# Patient Record
Sex: FEMALE | Race: OTHER | NOT HISPANIC OR LATINO
[De-identification: names, ages, dates, MRNs, and addresses within clinical notes are randomized per-mention and may not be internally consistent; named-entity substitution may affect disease eponyms.]

---

## 2017-06-26 PROBLEM — Z00.00 ENCOUNTER FOR PREVENTIVE HEALTH EXAMINATION: Status: ACTIVE | Noted: 2017-06-26

## 2017-08-22 ENCOUNTER — APPOINTMENT (OUTPATIENT)
Dept: OBGYN | Facility: CLINIC | Age: 32
End: 2017-08-22
Payer: COMMERCIAL

## 2017-08-22 VITALS
DIASTOLIC BLOOD PRESSURE: 74 MMHG | BODY MASS INDEX: 19.7 KG/M2 | WEIGHT: 130 LBS | SYSTOLIC BLOOD PRESSURE: 110 MMHG | HEIGHT: 68 IN

## 2017-08-22 PROCEDURE — 81025 URINE PREGNANCY TEST: CPT

## 2017-08-22 PROCEDURE — 99395 PREV VISIT EST AGE 18-39: CPT

## 2017-08-25 LAB
CYTOLOGY CVX/VAG DOC THIN PREP: NORMAL
HPV HIGH+LOW RISK DNA PNL CVX: NEGATIVE

## 2017-09-07 ENCOUNTER — TRANSCRIPTION ENCOUNTER (OUTPATIENT)
Age: 32
End: 2017-09-07

## 2018-02-09 ENCOUNTER — LABORATORY RESULT (OUTPATIENT)
Age: 33
End: 2018-02-09

## 2018-02-10 ENCOUNTER — LABORATORY RESULT (OUTPATIENT)
Age: 33
End: 2018-02-10

## 2018-02-14 LAB — HCG-TM SERPL-MCNC: 12 MIU/ML

## 2018-12-04 ENCOUNTER — APPOINTMENT (OUTPATIENT)
Dept: ANTEPARTUM | Facility: CLINIC | Age: 33
End: 2018-12-04
Payer: COMMERCIAL

## 2018-12-04 ENCOUNTER — APPOINTMENT (OUTPATIENT)
Dept: OBGYN | Facility: CLINIC | Age: 33
End: 2018-12-04
Payer: COMMERCIAL

## 2018-12-04 ENCOUNTER — ASOB RESULT (OUTPATIENT)
Age: 33
End: 2018-12-04

## 2018-12-04 VITALS
SYSTOLIC BLOOD PRESSURE: 124 MMHG | HEART RATE: 86 BPM | DIASTOLIC BLOOD PRESSURE: 82 MMHG | HEIGHT: 68 IN | WEIGHT: 142 LBS | BODY MASS INDEX: 21.52 KG/M2

## 2018-12-04 LAB
HCG UR QL: POSITIVE
QUALITY CONTROL: YES

## 2018-12-04 PROCEDURE — 81025 URINE PREGNANCY TEST: CPT

## 2018-12-04 PROCEDURE — 76817 TRANSVAGINAL US OBSTETRIC: CPT

## 2018-12-04 PROCEDURE — 99213 OFFICE O/P EST LOW 20 MIN: CPT

## 2018-12-19 ENCOUNTER — APPOINTMENT (OUTPATIENT)
Dept: ANTEPARTUM | Facility: CLINIC | Age: 33
End: 2018-12-19
Payer: COMMERCIAL

## 2018-12-19 ENCOUNTER — ASOB RESULT (OUTPATIENT)
Age: 33
End: 2018-12-19

## 2018-12-19 ENCOUNTER — APPOINTMENT (OUTPATIENT)
Dept: OBGYN | Facility: CLINIC | Age: 33
End: 2018-12-19
Payer: COMMERCIAL

## 2018-12-19 VITALS
DIASTOLIC BLOOD PRESSURE: 82 MMHG | HEIGHT: 68 IN | BODY MASS INDEX: 21.82 KG/M2 | SYSTOLIC BLOOD PRESSURE: 118 MMHG | WEIGHT: 144 LBS

## 2018-12-19 PROCEDURE — 0501F PRENATAL FLOW SHEET: CPT

## 2018-12-19 PROCEDURE — 0502F SUBSEQUENT PRENATAL CARE: CPT

## 2018-12-19 PROCEDURE — 76801 OB US < 14 WKS SINGLE FETUS: CPT

## 2019-01-10 ENCOUNTER — ASOB RESULT (OUTPATIENT)
Age: 34
End: 2019-01-10

## 2019-01-10 ENCOUNTER — APPOINTMENT (OUTPATIENT)
Dept: ANTEPARTUM | Facility: CLINIC | Age: 34
End: 2019-01-10
Payer: COMMERCIAL

## 2019-01-10 PROCEDURE — 36416 COLLJ CAPILLARY BLOOD SPEC: CPT

## 2019-01-10 PROCEDURE — 76813 OB US NUCHAL MEAS 1 GEST: CPT

## 2019-01-12 ENCOUNTER — LABORATORY RESULT (OUTPATIENT)
Age: 34
End: 2019-01-12

## 2019-01-16 LAB
1ST TRIMESTER DATA: NORMAL
ADDENDUM DOC: NORMAL
AFP PNL SERPL: NORMAL
AFP SERPL-ACNC: NORMAL
CLINICAL BIOCHEMIST REVIEW: NORMAL
FREE BETA HCG 1ST TRIMESTER: NORMAL
Lab: NORMAL
NOTES NTD: NORMAL
NT: NORMAL
PAPP-A SERPL-ACNC: NORMAL
TRISOMY 18/3: NORMAL

## 2019-01-17 ENCOUNTER — APPOINTMENT (OUTPATIENT)
Dept: OBGYN | Facility: CLINIC | Age: 34
End: 2019-01-17
Payer: COMMERCIAL

## 2019-01-17 VITALS
SYSTOLIC BLOOD PRESSURE: 119 MMHG | BODY MASS INDEX: 22.58 KG/M2 | WEIGHT: 149 LBS | DIASTOLIC BLOOD PRESSURE: 81 MMHG | HEIGHT: 68 IN

## 2019-01-17 PROCEDURE — 0502F SUBSEQUENT PRENATAL CARE: CPT

## 2019-02-13 ENCOUNTER — APPOINTMENT (OUTPATIENT)
Dept: OBGYN | Facility: CLINIC | Age: 34
End: 2019-02-13
Payer: COMMERCIAL

## 2019-02-13 VITALS
DIASTOLIC BLOOD PRESSURE: 60 MMHG | SYSTOLIC BLOOD PRESSURE: 110 MMHG | HEIGHT: 68 IN | WEIGHT: 152 LBS | BODY MASS INDEX: 23.04 KG/M2

## 2019-02-13 PROCEDURE — 0502F SUBSEQUENT PRENATAL CARE: CPT

## 2019-03-04 ENCOUNTER — TRANSCRIPTION ENCOUNTER (OUTPATIENT)
Age: 34
End: 2019-03-04

## 2019-03-05 ENCOUNTER — APPOINTMENT (OUTPATIENT)
Dept: ANTEPARTUM | Facility: CLINIC | Age: 34
End: 2019-03-05
Payer: COMMERCIAL

## 2019-03-05 ENCOUNTER — ASOB RESULT (OUTPATIENT)
Age: 34
End: 2019-03-05

## 2019-03-05 PROCEDURE — 76817 TRANSVAGINAL US OBSTETRIC: CPT

## 2019-03-05 PROCEDURE — 76811 OB US DETAILED SNGL FETUS: CPT

## 2019-03-13 ENCOUNTER — APPOINTMENT (OUTPATIENT)
Dept: OBGYN | Facility: CLINIC | Age: 34
End: 2019-03-13
Payer: COMMERCIAL

## 2019-03-13 ENCOUNTER — NON-APPOINTMENT (OUTPATIENT)
Age: 34
End: 2019-03-13

## 2019-03-13 VITALS
WEIGHT: 160 LBS | DIASTOLIC BLOOD PRESSURE: 60 MMHG | SYSTOLIC BLOOD PRESSURE: 110 MMHG | BODY MASS INDEX: 24.25 KG/M2 | HEIGHT: 68 IN

## 2019-03-13 PROCEDURE — 0502F SUBSEQUENT PRENATAL CARE: CPT

## 2019-03-14 ENCOUNTER — LABORATORY RESULT (OUTPATIENT)
Age: 34
End: 2019-03-14

## 2019-04-10 ENCOUNTER — APPOINTMENT (OUTPATIENT)
Dept: OBGYN | Facility: CLINIC | Age: 34
End: 2019-04-10
Payer: COMMERCIAL

## 2019-04-10 VITALS
SYSTOLIC BLOOD PRESSURE: 112 MMHG | BODY MASS INDEX: 25.01 KG/M2 | HEIGHT: 68 IN | DIASTOLIC BLOOD PRESSURE: 65 MMHG | WEIGHT: 165 LBS

## 2019-04-10 PROCEDURE — 0502F SUBSEQUENT PRENATAL CARE: CPT

## 2019-04-13 ENCOUNTER — LABORATORY RESULT (OUTPATIENT)
Age: 34
End: 2019-04-13

## 2019-04-30 ENCOUNTER — APPOINTMENT (OUTPATIENT)
Dept: ANTEPARTUM | Facility: CLINIC | Age: 34
End: 2019-04-30
Payer: COMMERCIAL

## 2019-04-30 ENCOUNTER — ASOB RESULT (OUTPATIENT)
Age: 34
End: 2019-04-30

## 2019-04-30 PROCEDURE — 76819 FETAL BIOPHYS PROFIL W/O NST: CPT

## 2019-04-30 PROCEDURE — 76816 OB US FOLLOW-UP PER FETUS: CPT

## 2019-05-08 ENCOUNTER — APPOINTMENT (OUTPATIENT)
Dept: OBGYN | Facility: CLINIC | Age: 34
End: 2019-05-08
Payer: COMMERCIAL

## 2019-05-08 VITALS
BODY MASS INDEX: 26.07 KG/M2 | HEIGHT: 68 IN | SYSTOLIC BLOOD PRESSURE: 120 MMHG | DIASTOLIC BLOOD PRESSURE: 70 MMHG | WEIGHT: 172 LBS

## 2019-05-08 PROCEDURE — 0502F SUBSEQUENT PRENATAL CARE: CPT

## 2019-05-24 ENCOUNTER — APPOINTMENT (OUTPATIENT)
Dept: OBGYN | Facility: CLINIC | Age: 34
End: 2019-05-24
Payer: COMMERCIAL

## 2019-05-24 VITALS
DIASTOLIC BLOOD PRESSURE: 70 MMHG | WEIGHT: 176 LBS | BODY MASS INDEX: 26.67 KG/M2 | HEIGHT: 68 IN | SYSTOLIC BLOOD PRESSURE: 110 MMHG

## 2019-05-24 PROCEDURE — 0502F SUBSEQUENT PRENATAL CARE: CPT

## 2019-06-10 ENCOUNTER — APPOINTMENT (OUTPATIENT)
Dept: OBGYN | Facility: CLINIC | Age: 34
End: 2019-06-10
Payer: COMMERCIAL

## 2019-06-10 VITALS
SYSTOLIC BLOOD PRESSURE: 120 MMHG | BODY MASS INDEX: 26.52 KG/M2 | DIASTOLIC BLOOD PRESSURE: 60 MMHG | HEIGHT: 68 IN | WEIGHT: 175 LBS

## 2019-06-10 PROCEDURE — 0502F SUBSEQUENT PRENATAL CARE: CPT

## 2019-06-20 ENCOUNTER — TRANSCRIPTION ENCOUNTER (OUTPATIENT)
Age: 34
End: 2019-06-20

## 2019-06-25 ENCOUNTER — APPOINTMENT (OUTPATIENT)
Dept: ANTEPARTUM | Facility: CLINIC | Age: 34
End: 2019-06-25

## 2019-06-26 ENCOUNTER — APPOINTMENT (OUTPATIENT)
Dept: OBGYN | Facility: CLINIC | Age: 34
End: 2019-06-26
Payer: COMMERCIAL

## 2019-06-26 ENCOUNTER — ASOB RESULT (OUTPATIENT)
Age: 34
End: 2019-06-26

## 2019-06-26 ENCOUNTER — APPOINTMENT (OUTPATIENT)
Dept: ANTEPARTUM | Facility: CLINIC | Age: 34
End: 2019-06-26
Payer: COMMERCIAL

## 2019-06-26 ENCOUNTER — LABORATORY RESULT (OUTPATIENT)
Age: 34
End: 2019-06-26

## 2019-06-26 VITALS
DIASTOLIC BLOOD PRESSURE: 70 MMHG | BODY MASS INDEX: 26.98 KG/M2 | WEIGHT: 178 LBS | SYSTOLIC BLOOD PRESSURE: 110 MMHG | HEIGHT: 68 IN

## 2019-06-26 PROCEDURE — 76819 FETAL BIOPHYS PROFIL W/O NST: CPT

## 2019-06-26 PROCEDURE — 0502F SUBSEQUENT PRENATAL CARE: CPT

## 2019-06-26 PROCEDURE — 76816 OB US FOLLOW-UP PER FETUS: CPT

## 2019-07-03 ENCOUNTER — APPOINTMENT (OUTPATIENT)
Dept: OBGYN | Facility: CLINIC | Age: 34
End: 2019-07-03
Payer: COMMERCIAL

## 2019-07-03 VITALS
WEIGHT: 178 LBS | BODY MASS INDEX: 26.98 KG/M2 | SYSTOLIC BLOOD PRESSURE: 110 MMHG | HEIGHT: 68 IN | DIASTOLIC BLOOD PRESSURE: 74 MMHG

## 2019-07-03 PROCEDURE — 0502F SUBSEQUENT PRENATAL CARE: CPT

## 2019-07-10 ENCOUNTER — APPOINTMENT (OUTPATIENT)
Dept: OBGYN | Facility: CLINIC | Age: 34
End: 2019-07-10
Payer: COMMERCIAL

## 2019-07-10 VITALS
WEIGHT: 178 LBS | SYSTOLIC BLOOD PRESSURE: 111 MMHG | DIASTOLIC BLOOD PRESSURE: 70 MMHG | BODY MASS INDEX: 26.98 KG/M2 | HEIGHT: 68 IN

## 2019-07-10 PROCEDURE — 0502F SUBSEQUENT PRENATAL CARE: CPT

## 2019-07-16 ENCOUNTER — APPOINTMENT (OUTPATIENT)
Dept: OBGYN | Facility: CLINIC | Age: 34
End: 2019-07-16
Payer: COMMERCIAL

## 2019-07-16 VITALS
DIASTOLIC BLOOD PRESSURE: 72 MMHG | SYSTOLIC BLOOD PRESSURE: 118 MMHG | BODY MASS INDEX: 26.98 KG/M2 | HEIGHT: 68 IN | WEIGHT: 178 LBS

## 2019-07-16 PROCEDURE — 0502F SUBSEQUENT PRENATAL CARE: CPT

## 2019-07-20 ENCOUNTER — LABORATORY RESULT (OUTPATIENT)
Age: 34
End: 2019-07-20

## 2019-07-22 ENCOUNTER — APPOINTMENT (OUTPATIENT)
Dept: OBGYN | Facility: CLINIC | Age: 34
End: 2019-07-22
Payer: COMMERCIAL

## 2019-07-22 VITALS
WEIGHT: 179 LBS | DIASTOLIC BLOOD PRESSURE: 70 MMHG | SYSTOLIC BLOOD PRESSURE: 120 MMHG | HEIGHT: 68 IN | BODY MASS INDEX: 27.13 KG/M2

## 2019-07-22 PROCEDURE — 0502F SUBSEQUENT PRENATAL CARE: CPT

## 2019-07-22 PROCEDURE — 59025 FETAL NON-STRESS TEST: CPT

## 2019-07-23 ENCOUNTER — INPATIENT (INPATIENT)
Facility: HOSPITAL | Age: 34
LOS: 3 days | Discharge: HOME | End: 2019-07-27
Attending: OBSTETRICS & GYNECOLOGY | Admitting: OBSTETRICS & GYNECOLOGY
Payer: COMMERCIAL

## 2019-07-23 VITALS
WEIGHT: 177.91 LBS | HEART RATE: 78 BPM | DIASTOLIC BLOOD PRESSURE: 72 MMHG | HEIGHT: 68 IN | RESPIRATION RATE: 20 BRPM | TEMPERATURE: 99 F | SYSTOLIC BLOOD PRESSURE: 121 MMHG

## 2019-07-23 DIAGNOSIS — Z3A.40 40 WEEKS GESTATION OF PREGNANCY: ICD-10-CM

## 2019-07-23 DIAGNOSIS — O26.893 OTHER SPECIFIED PREGNANCY RELATED CONDITIONS, THIRD TRIMESTER: ICD-10-CM

## 2019-07-23 DIAGNOSIS — H69.93 UNSPECIFIED EUSTACHIAN TUBE DISORDER, BILATERAL: Chronic | ICD-10-CM

## 2019-07-23 LAB
APPEARANCE UR: CLEAR — SIGNIFICANT CHANGE UP
BACTERIA # UR AUTO: ABNORMAL
BASOPHILS # BLD AUTO: 0.02 K/UL — SIGNIFICANT CHANGE UP (ref 0–0.2)
BASOPHILS NFR BLD AUTO: 0.2 % — SIGNIFICANT CHANGE UP (ref 0–2)
BILIRUB UR-MCNC: NEGATIVE — SIGNIFICANT CHANGE UP
BLD GP AB SCN SERPL QL: SIGNIFICANT CHANGE UP
COLOR SPEC: YELLOW — SIGNIFICANT CHANGE UP
DIFF PNL FLD: NEGATIVE — SIGNIFICANT CHANGE UP
EOSINOPHIL # BLD AUTO: 0.03 K/UL — SIGNIFICANT CHANGE UP (ref 0–0.5)
EOSINOPHIL NFR BLD AUTO: 0.3 % — SIGNIFICANT CHANGE UP (ref 0–6)
EPI CELLS # UR: SIGNIFICANT CHANGE UP
GLUCOSE UR QL: NEGATIVE MG/DL — SIGNIFICANT CHANGE UP
HCT VFR BLD CALC: 34.2 % — LOW (ref 34.5–45)
HGB BLD-MCNC: 11.5 G/DL — SIGNIFICANT CHANGE UP (ref 11.5–15.5)
IMM GRANULOCYTES NFR BLD AUTO: 0.5 % — SIGNIFICANT CHANGE UP (ref 0–1.5)
KETONES UR-MCNC: NEGATIVE — SIGNIFICANT CHANGE UP
LEUKOCYTE ESTERASE UR-ACNC: ABNORMAL
LYMPHOCYTES # BLD AUTO: 1.42 K/UL — SIGNIFICANT CHANGE UP (ref 1–3.3)
LYMPHOCYTES # BLD AUTO: 13.2 % — SIGNIFICANT CHANGE UP (ref 13–44)
MCHC RBC-ENTMCNC: 33 PG — SIGNIFICANT CHANGE UP (ref 27–34)
MCHC RBC-ENTMCNC: 33.6 GM/DL — SIGNIFICANT CHANGE UP (ref 32–36)
MCV RBC AUTO: 98 FL — SIGNIFICANT CHANGE UP (ref 80–100)
MONOCYTES # BLD AUTO: 0.46 K/UL — SIGNIFICANT CHANGE UP (ref 0–0.9)
MONOCYTES NFR BLD AUTO: 4.3 % — SIGNIFICANT CHANGE UP (ref 2–14)
NEUTROPHILS # BLD AUTO: 8.75 K/UL — HIGH (ref 1.8–7.4)
NEUTROPHILS NFR BLD AUTO: 81.5 % — HIGH (ref 43–77)
NITRITE UR-MCNC: NEGATIVE — SIGNIFICANT CHANGE UP
PH UR: 6 — SIGNIFICANT CHANGE UP (ref 5–8)
PLATELET # BLD AUTO: 216 K/UL — SIGNIFICANT CHANGE UP (ref 150–400)
PROT UR-MCNC: NEGATIVE MG/DL — SIGNIFICANT CHANGE UP
RBC # BLD: 3.49 M/UL — LOW (ref 3.8–5.2)
RBC # FLD: 13.1 % — SIGNIFICANT CHANGE UP (ref 10.3–14.5)
RBC CASTS # UR COMP ASSIST: SIGNIFICANT CHANGE UP /HPF (ref 0–4)
SP GR SPEC: 1.01 — SIGNIFICANT CHANGE UP (ref 1.01–1.02)
UROBILINOGEN FLD QL: NEGATIVE MG/DL — SIGNIFICANT CHANGE UP
WBC # BLD: 10.73 K/UL — HIGH (ref 3.8–10.5)
WBC # FLD AUTO: 10.73 K/UL — HIGH (ref 3.8–10.5)
WBC UR QL: SIGNIFICANT CHANGE UP

## 2019-07-23 RX ORDER — CITRIC ACID/SODIUM CITRATE 300-500 MG
30 SOLUTION, ORAL ORAL ONCE
Refills: 0 | Status: COMPLETED | OUTPATIENT
Start: 2019-07-23 | End: 2019-07-24

## 2019-07-23 RX ORDER — SODIUM CHLORIDE 9 MG/ML
1000 INJECTION, SOLUTION INTRAVENOUS
Refills: 0 | Status: DISCONTINUED | OUTPATIENT
Start: 2019-07-23 | End: 2019-07-25

## 2019-07-23 RX ORDER — OXYTOCIN 10 UNIT/ML
333.33 VIAL (ML) INJECTION
Qty: 20 | Refills: 0 | Status: COMPLETED | OUTPATIENT
Start: 2019-07-23 | End: 2019-07-23

## 2019-07-23 RX ORDER — AMPICILLIN TRIHYDRATE 250 MG
1 CAPSULE ORAL EVERY 4 HOURS
Refills: 0 | Status: DISCONTINUED | OUTPATIENT
Start: 2019-07-23 | End: 2019-07-25

## 2019-07-23 RX ORDER — AMPICILLIN TRIHYDRATE 250 MG
2 CAPSULE ORAL ONCE
Refills: 0 | Status: COMPLETED | OUTPATIENT
Start: 2019-07-23 | End: 2019-07-23

## 2019-07-23 RX ADMIN — Medication 216 GRAM(S): at 18:39

## 2019-07-23 RX ADMIN — SODIUM CHLORIDE 125 MILLILITER(S): 9 INJECTION, SOLUTION INTRAVENOUS at 18:00

## 2019-07-23 RX ADMIN — Medication 108 GRAM(S): at 22:31

## 2019-07-23 NOTE — OB RN PATIENT PROFILE - WEIGHT IN LBS
Prednisone Counseling:  I discussed with the patient the risks of prolonged use of prednisone including but not limited to weight gain, insomnia, osteoporosis, mood changes, diabetes, susceptibility to infection, glaucoma and high blood pressure. In cases where prednisone use is prolonged, patients should be monitored with blood pressure checks, serum glucose levels and an eye exam.  Additionally, the patient may need to be placed on GI prophylaxis, PCP prophylaxis, and calcium and vitamin D supplementation and/or a bisphosphonate. The patient verbalized understanding of the proper use and the possible adverse effects of prednisone. All of the patient's questions and concerns were addressed. Erivedge Counseling- I discussed with the patient the risks of Erivedge including but not limited to nausea, vomiting, diarrhea, constipation, weight loss, changes in the sense of taste, decreased appetite, muscle spasms, and hair loss. The patient verbalized understanding of the proper use and possible adverse effects of Erivedge. All of the patient's questions and concerns were addressed. Tremfya Counseling: I discussed with the patient the risks of guselkumab including but not limited to immunosuppression, serious infections, worsening of inflammatory bowel disease and drug reactions. The patient understands that monitoring is required including a PPD at baseline and must alert us or the primary physician if symptoms of infection or other concerning signs are noted. Picato Counseling:  I discussed with the patient the risks of Picato including but not limited to erythema, scaling, itching, weeping, crusting, and pain. Bactrim Counseling:  I discussed with the patient the risks of sulfa antibiotics including but not limited to GI upset, allergic reaction, drug rash, diarrhea, dizziness, photosensitivity, and yeast infections. Rarely, more serious reactions can occur including but not limited to aplastic anemia, agranulocytosis, methemoglobinemia, blood dyscrasias, liver or kidney failure, lung infiltrates or desquamative/blistering drug rashes. Cimetidine Pregnancy And Lactation Text: This medication is Pregnancy Category B and is considered safe during pregnancy. It is also excreted in breast milk and breast feeding isn't recommended. Humira Counseling:  I discussed with the patient the risks of adalimumab including but not limited to myelosuppression, immunosuppression, autoimmune hepatitis, demyelinating diseases, lymphoma, and serious infections. The patient understands that monitoring is required including a PPD at baseline and must alert us or the primary physician if symptoms of infection or other concerning signs are noted. Enbrel Pregnancy And Lactation Text: This medication is Pregnancy Category B and is considered safe during pregnancy. It is unknown if this medication is excreted in breast milk. Carac Counseling:  I discussed with the patient the risks of Carac including but not limited to erythema, scaling, itching, weeping, crusting, and pain. 177.9 Cimetidine Counseling:  I discussed with the patient the risks of Cimetidine including but not limited to gynecomastia, headache, diarrhea, nausea, drowsiness, arrhythmias, pancreatitis, skin rashes, psychosis, bone marrow suppression and kidney toxicity. Rifampin Pregnancy And Lactation Text: This medication is Pregnancy Category C and it isn't know if it is safe during pregnancy. It is also excreted in breast milk and should not be used if you are breast feeding. Wartpeel Pregnancy And Lactation Text: This medication is Pregnancy Category X and contraindicated in pregnancy and in women who may become pregnant. It is unknown if this medication is excreted in breast milk. Oxybutynin Pregnancy And Lactation Text: This medication is Pregnancy Category B and is considered safe during pregnancy. It is unknown if it is excreted in breast milk. Bactrim Pregnancy And Lactation Text: This medication is Pregnancy Category D and is known to cause fetal risk. It is also excreted in breast milk. Doxepin Counseling:  Patient advised that the medication is sedating and not to drive a car after taking this medication. Patient informed of potential adverse effects including but not limited to dry mouth, urinary retention, and blurry vision. The patient verbalized understanding of the proper use and possible adverse effects of doxepin. All of the patient's questions and concerns were addressed. Erivedge Pregnancy And Lactation Text: This medication is Pregnancy Category X and is absolutely contraindicated during pregnancy. It is unknown if it is excreted in breast milk. Picato Pregnancy And Lactation Text: This medication is Pregnancy Category C. It is unknown if this medication is excreted in breast milk. 5-Fu Counseling: 5-Fluorouracil Counseling:  I discussed with the patient the risks of 5-fluorouracil including but not limited to erythema, scaling, itching, weeping, crusting, and pain. Zyclara Counseling:  I discussed with the patient the risks of imiquimod including but not limited to erythema, scaling, itching, weeping, crusting, and pain. Patient understands that the inflammatory response to imiquimod is variable from person to person and was educated regarded proper titration schedule. If flu-like symptoms develop, patient knows to discontinue the medication and contact us. Birth Control Pills Counseling: Birth Control Pill Counseling: I discussed with the patient the potential side effects of OCPs including but not limited to increased risk of stroke, heart attack, thrombophlebitis, deep venous thrombosis, hepatic adenomas, breast changes, GI upset, headaches, and depression. The patient verbalized understanding of the proper use and possible adverse effects of OCPs. All of the patient's questions and concerns were addressed. Tetracycline Counseling: Patient counseled regarding possible photosensitivity and increased risk for sunburn. Patient instructed to avoid sunlight, if possible. When exposed to sunlight, patients should wear protective clothing, sunglasses, and sunscreen. The patient was instructed to call the office immediately if the following severe adverse effects occur:  hearing changes, easy bruising/bleeding, severe headache, or vision changes. The patient verbalized understanding of the proper use and possible adverse effects of tetracycline. All of the patient's questions and concerns were addressed. Patient understands to avoid pregnancy while on therapy due to potential birth defects. Tremfya Pregnancy And Lactation Text: The risk during pregnancy and breastfeeding is uncertain with this medication. Prednisone Pregnancy And Lactation Text: This medication is Pregnancy Category C and it isn't know if it is safe during pregnancy. This medication is excreted in breast milk. Protopic Counseling: Patient may experience a mild burning sensation during topical application. Protopic is not approved in children less than 3years of age. There have been case reports of hematologic and skin malignancies in patients using topical calcineurin inhibitors although causality is questionable. Doxepin Pregnancy And Lactation Text: This medication is Pregnancy Category C and it isn't known if it is safe during pregnancy. It is also excreted in breast milk and breast feeding isn't recommended. Spironolactone Counseling: Patient advised regarding risks of diarrhea, abdominal pain, hyperkalemia, birth defects (for female patients), liver toxicity and renal toxicity. The patient may need blood work to monitor liver and kidney function and potassium levels while on therapy. The patient verbalized understanding of the proper use and possible adverse effects of spironolactone. All of the patient's questions and concerns were addressed. Tetracycline Pregnancy And Lactation Text: This medication is Pregnancy Category D and not consider safe during pregnancy. It is also excreted in breast milk. Xeljanz Counseling: Mellissa Gilbert Counseling: I discussed with the patient the risks of Samul Ran therapy including increased risk of infection, liver issues, headache, diarrhea, or cold symptoms. Live vaccines should be avoided. They were instructed to call if they have any problems. Birth Control Pills Pregnancy And Lactation Text: This medication should be avoided if pregnant and for the first 30 days post-partum. Cephalexin Counseling: I counseled the patient regarding use of cephalexin as an antibiotic for prophylactic and/or therapeutic purposes. Cephalexin (commonly prescribed under brand name Keflex) is a cephalosporin antibiotic which is active against numerous classes of bacteria, including most skin bacteria. Side effects may include nausea, diarrhea, gastrointestinal upset, rash, hives, yeast infections, and in rare cases, hepatitis, kidney disease, seizures, fever, confusion, neurologic symptoms, and others. Patients with severe allergies to penicillin medications are cautioned that there is about a 10% incidence of cross-reactivity with cephalosporins. When possible, patients with penicillin allergies should use alternatives to cephalosporins for antibiotic therapy. Gabapentin Counseling: I discussed with the patient the risks of gabapentin including but not limited to dizziness, somnolence, fatigue and ataxia. Hydroxyzine Counseling: Patient advised that the medication is sedating and not to drive a car after taking this medication. Patient informed of potential adverse effects including but not limited to dry mouth, urinary retention, and blurry vision. The patient verbalized understanding of the proper use and possible adverse effects of hydroxyzine. All of the patient's questions and concerns were addressed. Protopic Pregnancy And Lactation Text: This medication is Pregnancy Category C. It is unknown if this medication is excreted in breast milk when applied topically. Opioid Counseling: I discussed with the patient the potential side effects of opioids including but not limited to addiction, altered mental status, and depression. I stressed avoiding alcohol, benzodiazepines, muscle relaxants and sleep aids unless specifically okayed by a physician. The patient verbalized understanding of the proper use and possible adverse effects of opioids. All of the patient's questions and concerns were addressed. They were instructed to flush the remaining pills down the toilet if they did not need them for pain. Acitretin Pregnancy And Lactation Text: This medication is Pregnancy Category X and should not be given to women who are pregnant or may become pregnant in the future. This medication is excreted in breast milk. Drysol Counseling:  I discussed with the patient the risks of drysol/aluminum chloride including but not limited to skin rash, itching, irritation, burning. Fluconazole Counseling:  Patient counseled regarding adverse effects of fluconazole including but not limited to headache, diarrhea, nausea, upset stomach, liver function test abnormalities, taste disturbance, and stomach pain. There is a rare possibility of liver failure that can occur when taking fluconazole. The patient understands that monitoring of LFTs and kidney function test may be required, especially at baseline. The patient verbalized understanding of the proper use and possible adverse effects of fluconazole. All of the patient's questions and concerns were addressed. Acitretin Counseling:  I discussed with the patient the risks of acitretin including but not limited to hair loss, dry lips/skin/eyes, liver damage, hyperlipidemia, depression/suicidal ideation, photosensitivity. Serious rare side effects can include but are not limited to pancreatitis, pseudotumor cerebri, bony changes, clot formation/stroke/heart attack. Patient understands that alcohol is contraindicated since it can result in liver toxicity and significantly prolong the elimination of the drug by many years. Ilumya Counseling: I discussed with the patient the risks of tildrakizumab including but not limited to immunosuppression, malignancy, posterior leukoencephalopathy syndrome, and serious infections. The patient understands that monitoring is required including a PPD at baseline and must alert us or the primary physician if symptoms of infection or other concerning signs are noted. Gabapentin Pregnancy And Lactation Text: This medication is Pregnancy Category C and isn't considered safe during pregnancy. It is excreted in breast milk. Cephalexin Pregnancy And Lactation Text: This medication is Pregnancy Category B and considered safe during pregnancy. It is also excreted in breast milk but can be used safely for shorter doses. Xelbreannz Pregnancy And Lactation Text: This medication is Pregnancy Category D and is not considered safe during pregnancy. The risk during breast feeding is also uncertain. Infliximab Counseling:  I discussed with the patient the risks of infliximab including but not limited to myelosuppression, immunosuppression, autoimmune hepatitis, demyelinating diseases, lymphoma, and serious infections. The patient understands that monitoring is required including a PPD at baseline and must alert us or the primary physician if symptoms of infection or other concerning signs are noted. Drysol Pregnancy And Lactation Text: This medication is considered safe during pregnancy and breast feeding. Clindamycin Counseling: I counseled the patient regarding use of clindamycin as an antibiotic for prophylactic and/or therapeutic purposes. Clindamycin is active against numerous classes of bacteria, including skin bacteria. Side effects may include nausea, diarrhea, gastrointestinal upset, rash, hives, yeast infections, and in rare cases, colitis. Hydroxyzine Pregnancy And Lactation Text: This medication is not safe during pregnancy and should not be taken. It is also excreted in breast milk and breast feeding isn't recommended. Opioid Pregnancy And Lactation Text: These medications can lead to premature delivery and should be avoided during pregnancy. These medications are also present in breast milk in small amounts. Glycopyrrolate Counseling:  I discussed with the patient the risks of glycopyrrolate including but not limited to skin rash, drowsiness, dry mouth, difficulty urinating, and blurred vision. Rhofade Counseling: Rhofade is a topical medication which can decrease superficial blood flow where applied. Side effects are uncommon and include stinging, redness and allergic reactions. Xolair Counseling:  Patient informed of potential adverse effects including but not limited to fever, muscle aches, rash and allergic reactions. The patient verbalized understanding of the proper use and possible adverse effects of Xolair. All of the patient's questions and concerns were addressed. Elidel Counseling: Patient may experience a mild burning sensation during topical application. Elidel is not approved in children less than 3years of age. There have been case reports of hematologic and skin malignancies in patients using topical calcineurin inhibitors although causality is questionable. Bexarotene Pregnancy And Lactation Text: This medication is Pregnancy Category X and should not be given to women who are pregnant or may become pregnant. This medication should not be used if you are breast feeding. Xolair Pregnancy And Lactation Text: This medication is Pregnancy Category B and is considered safe during pregnancy. This medication is excreted in breast milk. Bexarotene Counseling:  I discussed with the patient the risks of bexarotene including but not limited to hair loss, dry lips/skin/eyes, liver abnormalities, hyperlipidemia, pancreatitis, depression/suicidal ideation, photosensitivity, drug rash/allergic reactions, hypothyroidism, anemia, leukopenia, infection, cataracts, and teratogenicity. Patient understands that they will need regular blood tests to check lipid profile, liver function tests, white blood cell count, thyroid function tests and pregnancy test if applicable. Glycopyrrolate Pregnancy And Lactation Text: This medication is Pregnancy Category B and is considered safe during pregnancy. It is unknown if it is excreted breast milk. Rhofade Pregnancy And Lactation Text: This medication has not been assigned a Pregnancy Risk Category. It is unknown if the medication is excreted in breast milk. Clindamycin Pregnancy And Lactation Text: This medication can be used in pregnancy if certain situations. Clindamycin is also present in breast milk. Rituxan Counseling:  I discussed with the patient the risks of Rituxan infusions. Side effects can include infusion reactions, severe drug rashes including mucocutaneous reactions, reactivation of latent hepatitis and other infections and rarely progressive multifocal leukoencephalopathy. All of the patient's questions and concerns were addressed. Albendazole Counseling:  I discussed with the patient the risks of albendazole including but not limited to cytopenia, kidney damage, nausea/vomiting and severe allergy. The patient understands that this medication is being used in an off-label manner. Hydroxychloroquine Pregnancy And Lactation Text: This medication has been shown to cause fetal harm but it isn't assigned a Pregnancy Risk Category. There are small amounts excreted in breast milk. Fluconazole Pregnancy And Lactation Text: This medication is Pregnancy Category C and it isn't know if it is safe during pregnancy. It is also excreted in breast milk. Isotretinoin Counseling: Patient should get monthly blood tests, not donate blood, not drive at night if vision affected, not share medication, and not undergo elective surgery for 6 months after tx completed. Side effects reviewed, pt to contact office should one occur. Doxycycline Counseling:  Patient counseled regarding possible photosensitivity and increased risk for sunburn. Patient instructed to avoid sunlight, if possible. When exposed to sunlight, patients should wear protective clothing, sunglasses, and sunscreen. The patient was instructed to call the office immediately if the following severe adverse effects occur:  hearing changes, easy bruising/bleeding, severe headache, or vision changes. The patient verbalized understanding of the proper use and possible adverse effects of doxycycline. All of the patient's questions and concerns were addressed. Hydroxychloroquine Counseling:  I discussed with the patient that a baseline ophthalmologic exam is needed at the start of therapy and every year thereafter while on therapy. A CBC may also be warranted for monitoring. The side effects of this medication were discussed with the patient, including but not limited to agranulocytosis, aplastic anemia, seizures, rashes, retinopathy, and liver toxicity. Patient instructed to call the office should any adverse effect occur. The patient verbalized understanding of the proper use and possible adverse effects of Plaquenil. All the patient's questions and concerns were addressed. Solaraze Counseling:  I discussed with the patient the risks of Solaraze including but not limited to erythema, scaling, itching, weeping, crusting, and pain. Eucrisa Counseling: Patient may experience a mild burning sensation during topical application. Epi Leonard is not approved in children less than 3years of age. Rituxan Pregnancy And Lactation Text: This medication is Pregnancy Category C and it isn't know if it is safe during pregnancy. It is unknown if this medication is excreted in breast milk but similar antibodies are known to be excreted. Niacinamide Counseling: I recommended taking niacin or niacinamide, also know as vitamin B3, twice daily. Recent evidence suggests that taking vitamin B3 (500 mg twice daily) can reduce the risk of actinic keratoses and non-melanoma skin cancers. Side effects of vitamin B3 include flushing and headache. Azathioprine Pregnancy And Lactation Text: This medication is Pregnancy Category D and isn't considered safe during pregnancy. It is unknown if this medication is excreted in breast milk. Isotretinoin Pregnancy And Lactation Text: This medication is Pregnancy Category X and is considered extremely dangerous during pregnancy. It is unknown if it is excreted in breast milk. Griseofulvin Counseling:  I discussed with the patient the risks of griseofulvin including but not limited to photosensitivity, cytopenia, liver damage, nausea/vomiting and severe allergy. The patient understands that this medication is best absorbed when taken with a fatty meal (e.g., ice cream or french fries). Solaraze Pregnancy And Lactation Text: This medication is Pregnancy Category B and is considered safe. There is some data to suggest avoiding during the third trimester. It is unknown if this medication is excreted in breast milk. Azathioprine Counseling:  I discussed with the patient the risks of azathioprine including but not limited to myelosuppression, immunosuppression, hepatotoxicity, lymphoma, and infections. The patient understands that monitoring is required including baseline LFTs, Creatinine, possible TPMP genotyping and weekly CBCs for the first month and then every 2 weeks thereafter. The patient verbalized understanding of the proper use and possible adverse effects of azathioprine. All of the patient's questions and concerns were addressed. Doxycycline Pregnancy And Lactation Text: This medication is Pregnancy Category D and not consider safe during pregnancy. It is also excreted in breast milk but is considered safe for shorter treatment courses. Albendazole Pregnancy And Lactation Text: This medication is Pregnancy Category C and it isn't known if it is safe during pregnancy. It is also excreted in breast milk. Ivermectin Counseling:  Patient instructed to take medication on an empty stomach with a full glass of water. Patient informed of potential adverse effects including but not limited to nausea, diarrhea, dizziness, itching, and swelling of the extremities or lymph nodes. The patient verbalized understanding of the proper use and possible adverse effects of ivermectin. All of the patient's questions and concerns were addressed. Griseofulvin Pregnancy And Lactation Text: This medication is Pregnancy Category X and is known to cause serious birth defects. It is unknown if this medication is excreted in breast milk but breast feeding should be avoided. High Dose Vitamin A Counseling: Side effects reviewed, pt to contact office should one occur. Arava Counseling:  Patient counseled regarding adverse effects of Arava including but not limited to nausea, vomiting, abnormalities in liver function tests. Patients may develop mouth sores, rash, diarrhea, and abnormalities in blood counts. The patient understands that monitoring is required including LFTs and blood counts. There is a rare possibility of scarring of the liver and lung problems that can occur when taking methotrexate. Persistent nausea, loss of appetite, pale stools, dark urine, cough, and shortness of breath should be reported immediately. Patient advised to discontinue Arava treatment and consult with a physician prior to attempting conception. The patient will have to undergo a treatment to eliminate Arava from the body prior to conception. Siliq Counseling:  I discussed with the patient the risks of Siliq including but not limited to new or worsening depression, suicidal thoughts and behavior, immunosuppression, malignancy, posterior leukoencephalopathy syndrome, and serious infections. The patient understands that monitoring is required including a PPD at baseline and must alert us or the primary physician if symptoms of infection or other concerning signs are noted. There is also a special program designed to monitor depression which is required with Siliq. Niacinamide Pregnancy And Lactation Text: These medications are considered safe during pregnancy. Cellcept Counseling:  I discussed with the patient the risks of mycophenolate mofetil including but not limited to infection/immunosuppression, GI upset, hypokalemia, hypercholesterolemia, bone marrow suppression, lymphoproliferative disorders, malignancy, GI ulceration/bleed/perforation, colitis, interstitial lung disease, kidney failure, progressive multifocal leukoencephalopathy, and birth defects. The patient understands that monitoring is required including a baseline creatinine and regular CBC testing. In addition, patient must alert us immediately if symptoms of infection or other concerning signs are noted. Erythromycin Counseling:  I discussed with the patient the risks of erythromycin including but not limited to GI upset, allergic reaction, drug rash, diarrhea, increase in liver enzymes, and yeast infections. Topical Retinoid counseling:  Patient advised to apply a pea-sized amount only at bedtime and wait 30 minutes after washing their face before applying. If too drying, patient may add a non-comedogenic moisturizer. The patient verbalized understanding of the proper use and possible adverse effects of retinoids. All of the patient's questions and concerns were addressed. Spironolactone Pregnancy And Lactation Text: This medication can cause feminization of the male fetus and should be avoided during pregnancy. The active metabolite is also found in breast milk. Eucrisa Pregnancy And Lactation Text: This medication has not been assigned a Pregnancy Risk Category but animal studies failed to show danger with the topical medication. It is unknown if the medication is excreted in breast milk. High Dose Vitamin A Pregnancy And Lactation Text: High dose vitamin A therapy is contraindicated during pregnancy and breast feeding. Metronidazole Counseling:  I discussed with the patient the risks of metronidazole including but not limited to seizures, nausea/vomiting, a metallic taste in the mouth, nausea/vomiting and severe allergy. Nsaids Counseling: NSAID Counseling: I discussed with the patient that NSAIDs should be taken with food. Prolonged use of NSAIDs can result in the development of stomach ulcers. Patient advised to stop taking NSAIDs if abdominal pain occurs. The patient verbalized understanding of the proper use and possible adverse effects of NSAIDs. All of the patient's questions and concerns were addressed. Sski Pregnancy And Lactation Text: This medication is Pregnancy Category D and isn't considered safe during pregnancy. It is excreted in breast milk. SSKI Counseling:  I discussed with the patient the risks of SSKI including but not limited to thyroid abnormalities, metallic taste, GI upset, fever, headache, acne, arthralgias, paraesthesias, lymphadenopathy, easy bleeding, arrhythmias, and allergic reaction. Hydroquinone Counseling:  Patient advised that medication may result in skin irritation, lightening (hypopigmentation), dryness, and burning. In the event of skin irritation, the patient was advised to reduce the amount of the drug applied or use it less frequently. Rarely, spots that are treated with hydroquinone can become darker (pseudoochronosis). Should this occur, patient instructed to stop medication and call the office. The patient verbalized understanding of the proper use and possible adverse effects of hydroquinone. All of the patient's questions and concerns were addressed. Erythromycin Pregnancy And Lactation Text: This medication is Pregnancy Category B and is considered safe during pregnancy. It is also excreted in breast milk. Itraconazole Counseling:  I discussed with the patient the risks of itraconazole including but not limited to liver damage, nausea/vomiting, neuropathy, and severe allergy. The patient understands that this medication is best absorbed when taken with acidic beverages such as non-diet cola or ginger ale. The patient understands that monitoring is required including baseline LFTs and repeat LFTs at intervals. The patient understands that they are to contact us or the primary physician if concerning signs are noted. Metronidazole Pregnancy And Lactation Text: This medication is Pregnancy Category B and considered safe during pregnancy. It is also excreted in breast milk. Thalidomide Counseling: I discussed with the patient the risks of thalidomide including but not limited to birth defects, anxiety, weakness, chest pain, dizziness, cough and severe allergy. Detail Level: Simple Nsaids Pregnancy And Lactation Text: These medications are considered safe up to 30 weeks gestation. It is excreted in breast milk. Cyclophosphamide Counseling:  I discussed with the patient the risks of cyclophosphamide including but not limited to hair loss, hormonal abnormalities, decreased fertility, abdominal pain, diarrhea, nausea and vomiting, bone marrow suppression and infection. The patient understands that monitoring is required while taking this medication. Clofazimine Counseling:  I discussed with the patient the risks of clofazimine including but not limited to skin and eye pigmentation, liver damage, nausea/vomiting, gastrointestinal bleeding and allergy. Tazorac Pregnancy And Lactation Text: This medication is not safe during pregnancy. It is unknown if this medication is excreted in breast milk. Tazorac Counseling:  Patient advised that medication is irritating and drying. Patient may need to apply sparingly and wash off after an hour before eventually leaving it on overnight. The patient verbalized understanding of the proper use and possible adverse effects of tazorac. All of the patient's questions and concerns were addressed. Simponi Counseling:  I discussed with the patient the risks of golimumab including but not limited to myelosuppression, immunosuppression, autoimmune hepatitis, demyelinating diseases, lymphoma, and serious infections. The patient understands that monitoring is required including a PPD at baseline and must alert us or the primary physician if symptoms of infection or other concerning signs are noted. Minocycline Counseling: Patient advised regarding possible photosensitivity and discoloration of the teeth, skin, lips, tongue and gums. Patient instructed to avoid sunlight, if possible. When exposed to sunlight, patients should wear protective clothing, sunglasses, and sunscreen. The patient was instructed to call the office immediately if the following severe adverse effects occur:  hearing changes, easy bruising/bleeding, severe headache, or vision changes. The patient verbalized understanding of the proper use and possible adverse effects of minocycline. All of the patient's questions and concerns were addressed. Cyclophosphamide Pregnancy And Lactation Text: This medication is Pregnancy Category D and it isn't considered safe during pregnancy. This medication is excreted in breast milk. Topical Clindamycin Counseling: Patient counseled that this medication may cause skin irritation or allergic reactions. In the event of skin irritation, the patient was advised to reduce the amount of the drug applied or use it less frequently. The patient verbalized understanding of the proper use and possible adverse effects of clindamycin. All of the patient's questions and concerns were addressed. Cosentyx Counseling:  I discussed with the patient the risks of Cosentyx including but not limited to worsening of Crohn's disease, immunosuppression, allergic reactions and infections. The patient understands that monitoring is required including a PPD at baseline and must alert us or the primary physician if symptoms of infection or other concerning signs are noted. Ketoconazole Counseling:   Patient counseled regarding improving absorption with orange juice. Adverse effects include but are not limited to breast enlargement, headache, diarrhea, nausea, upset stomach, liver function test abnormalities, taste disturbance, and stomach pain. There is a rare possibility of liver failure that can occur when taking ketoconazole. The patient understands that monitoring of LFTs may be required, especially at baseline. The patient verbalized understanding of the proper use and possible adverse effects of ketoconazole. All of the patient's questions and concerns were addressed. Odomzo Counseling- I discussed with the patient the risks of Odomzo including but not limited to nausea, vomiting, diarrhea, constipation, weight loss, changes in the sense of taste, decreased appetite, muscle spasms, and hair loss. The patient verbalized understanding of the proper use and possible adverse effects of Odomzo. All of the patient's questions and concerns were addressed. Imiquimod Counseling:  I discussed with the patient the risks of imiquimod including but not limited to erythema, scaling, itching, weeping, crusting, and pain. Patient understands that the inflammatory response to imiquimod is variable from person to person and was educated regarded proper titration schedule. If flu-like symptoms develop, patient knows to discontinue the medication and contact us. Valtrex Counseling: I discussed with the patient the risks of valacyclovir including but not limited to kidney damage, nausea, vomiting and severe allergy. The patient understands that if the infection seems to be worsening or is not improving, they are to call. Minoxidil Counseling: Minoxidil is a topical medication which can increase blood flow where it is applied. It is uncertain how this medication increases hair growth. Side effects are uncommon and include stinging and allergic reactions. Stelara Counseling:  I discussed with the patient the risks of ustekinumab including but not limited to immunosuppression, malignancy, posterior leukoencephalopathy syndrome, and serious infections. The patient understands that monitoring is required including a PPD at baseline and must alert us or the primary physician if symptoms of infection or other concerning signs are noted. Cyclosporine Counseling:  I discussed with the patient the risks of cyclosporine including but not limited to hypertension, gingival hyperplasia,myelosuppression, immunosuppression, liver damage, kidney damage, neurotoxicity, lymphoma, and serious infections. The patient understands that monitoring is required including baseline blood pressure, CBC, CMP, lipid panel and uric acid, and then 1-2 times monthly CMP and blood pressure. Ketoconazole Pregnancy And Lactation Text: This medication is Pregnancy Category C and it isn't know if it is safe during pregnancy. It is also excreted in breast milk and breast feeding isn't recommended. Valtrex Pregnancy And Lactation Text: this medication is Pregnancy Category B and is considered safe during pregnancy. This medication is not directly found in breast milk but it's metabolite acyclovir is present. Quinolones Counseling:  I discussed with the patient the risks of fluoroquinolones including but not limited to GI upset, allergic reaction, drug rash, diarrhea, dizziness, photosensitivity, yeast infections, liver function test abnormalities, tendonitis/tendon rupture. Cimzia Pregnancy And Lactation Text: This medication crosses the placenta but can be considered safe in certain situations. Cimzia may be excreted in breast milk. Topical Sulfur Applications Counseling: Topical Sulfur Counseling: Patient counseled that this medication may cause skin irritation or allergic reactions. In the event of skin irritation, the patient was advised to reduce the amount of the drug applied or use it less frequently. The patient verbalized understanding of the proper use and possible adverse effects of topical sulfur application. All of the patient's questions and concerns were addressed. Cimzia Counseling:  I discussed with the patient the risks of Cimzia including but not limited to immunosuppression, allergic reactions and infections. The patient understands that monitoring is required including a PPD at baseline and must alert us or the primary physician if symptoms of infection or other concerning signs are noted. Terbinafine Counseling: Patient counseling regarding adverse effects of terbinafine including but not limited to headache, diarrhea, rash, upset stomach, liver function test abnormalities, itching, taste/smell disturbance, nausea, abdominal pain, and flatulence. There is a rare possibility of liver failure that can occur when taking terbinafine. The patient understands that a baseline LFT and kidney function test may be required. The patient verbalized understanding of the proper use and possible adverse effects of terbinafine. All of the patient's questions and concerns were addressed. Colchicine Counseling:  Patient counseled regarding adverse effects including but not limited to stomach upset (nausea, vomiting, stomach pain, or diarrhea). Patient instructed to limit alcohol consumption while taking this medication. Colchicine may reduce blood counts especially with prolonged use. The patient understands that monitoring of kidney function and blood counts may be required, especially at baseline. The patient verbalized understanding of the proper use and possible adverse effects of colchicine. All of the patient's questions and concerns were addressed. Include Pregnancy/Lactation Warning?: No Dupixent Counseling: I discussed with the patient the risks of dupilumab including but not limited to eye infection and irritation, cold sores, injection site reactions, worsening of asthma, allergic reactions and increased risk of parasitic infection. Live vaccines should be avoided while taking dupilumab. Dupilumab will also interact with certain medications such as warfarin and cyclosporine. The patient understands that monitoring is required and they must alert us or the primary physician if symptoms of infection or other concerning signs are noted. Otezla Counseling: Faylene Belem Counseling: The side effects of Faylene Belem were discussed with the patient, including but not limited to worsening or new depression, weight loss, diarrhea, nausea, upper respiratory tract infection, and headache. Patient instructed to call the office should any adverse effect occur. The patient verbalized understanding of the proper use and possible adverse effects of Otezla. All the patient's questions and concerns were addressed. Dapsone Counseling: I discussed with the patient the risks of dapsone including but not limited to hemolytic anemia, agranulocytosis, rashes, methemoglobinemia, kidney failure, peripheral neuropathy, headaches, GI upset, and liver toxicity. Patients who start dapsone require monitoring including baseline LFTs and weekly CBCs for the first month, then every month thereafter. The patient verbalized understanding of the proper use and possible adverse effects of dapsone. All of the patient's questions and concerns were addressed. Taltz Counseling: I discussed with the patient the risks of ixekizumab including but not limited to immunosuppression, serious infections, worsening of inflammatory bowel disease and drug reactions. The patient understands that monitoring is required including a PPD at baseline and must alert us or the primary physician if symptoms of infection or other concerning signs are noted. Methotrexate Counseling:  Patient counseled regarding adverse effects of methotrexate including but not limited to nausea, vomiting, abnormalities in liver function tests. Patients may develop mouth sores, rash, diarrhea, and abnormalities in blood counts. The patient understands that monitoring is required including LFT's and blood counts. There is a rare possibility of scarring of the liver and lung problems that can occur when taking methotrexate. Persistent nausea, loss of appetite, pale stools, dark urine, cough, and shortness of breath should be reported immediately. Patient advised to discontinue methotrexate treatment at least three months before attempting to become pregnant. I discussed the need for folate supplements while taking methotrexate. These supplements can decrease side effects during methotrexate treatment. The patient verbalized understanding of the proper use and possible adverse effects of methotrexate. All of the patient's questions and concerns were addressed. Topical Sulfur Applications Pregnancy And Lactation Text: This medication is Pregnancy Category C and has an unknown safety profile during pregnancy. It is unknown if this topical medication is excreted in breast milk. Enbrel Counseling:  I discussed with the patient the risks of etanercept including but not limited to myelosuppression, immunosuppression, autoimmune hepatitis, demyelinating diseases, lymphoma, and infections. The patient understands that monitoring is required including a PPD at baseline and must alert us or the primary physician if symptoms of infection or other concerning signs are noted. Mirvaso Counseling: Debera Cabot is a topical medication which can decrease superficial blood flow where applied. Side effects are uncommon and include stinging, redness and allergic reactions. Benzoyl Peroxide Pregnancy And Lactation Text: This medication is Pregnancy Category C. It is unknown if benzoyl peroxide is excreted in breast milk. Azithromycin Counseling:  I discussed with the patient the risks of azithromycin including but not limited to GI upset, allergic reaction, drug rash, diarrhea, and yeast infections. Dupixent Pregnancy And Lactation Text: This medication likely crosses the placenta but the risk for the fetus is uncertain. This medication is excreted in breast milk. Benzoyl Peroxide Counseling: Patient counseled that medicine may cause skin irritation and bleach clothing. In the event of skin irritation, the patient was advised to reduce the amount of the drug applied or use it less frequently. The patient verbalized understanding of the proper use and possible adverse effects of benzoyl peroxide. All of the patient's questions and concerns were addressed. Otezla Pregnancy And Lactation Text: This medication is Pregnancy Category C and it isn't known if it is safe during pregnancy. It is unknown if it is excreted in breast milk. Azithromycin Pregnancy And Lactation Text: This medication is considered safe during pregnancy and is also secreted in breast milk. Rifampin Counseling: I discussed with the patient the risks of rifampin including but not limited to liver damage, kidney damage, red-orange body fluids, nausea/vomiting and severe allergy. Oxybutynin Counseling:  I discussed with the patient the risks of oxybutynin including but not limited to skin rash, drowsiness, dry mouth, difficulty urinating, and blurred vision. Methotrexate Pregnancy And Lactation Text: This medication is Pregnancy Category X and is known to cause fetal harm. This medication is excreted in breast milk. Dapsone Pregnancy And Lactation Text: This medication is Pregnancy Category C and is not considered safe during pregnancy or breast feeding. Wartpeel Counseling:  I discussed with the patient the risks of Wartpeel including but not limited to erythema, scaling, itching, weeping, crusting, and pain.

## 2019-07-23 NOTE — OB PROVIDER H&P - ASSESSMENT
35 yo  at 40w 2d with 9w US (18) presents to L&D for elective induction of labor.   -Admit to L&D for induction with cytotec   -Vertex presentation confirmed   -Admission labs pending

## 2019-07-23 NOTE — OB RN DELIVERY SUMMARY - NS_LABORCHARACTER_OBGYN_ALL_OB
Induction of labor-Medicinal/Antibiotics in labor/Induction of labor-AROM/Augmentation of labor/Internal electronic FM/External electronic FM

## 2019-07-23 NOTE — OB RN PATIENT PROFILE - VISION (WITH CORRECTIVE LENSES IF THE PATIENT USUALLY WEARS THEM):
glasses with pt/Partially impaired: cannot see medication labels or newsprint, but can see obstacles in path, and the surrounding layout; can count fingers at arm's length

## 2019-07-23 NOTE — H&P ADULT - ASSESSMENT
35 yo  at 40w 2d consistent with 9w US (18) presents to L&D for elective induction of labor.     - Consent available at bedside  - GBS positive, on Ampicillin Abx  - IVF  - Continuous EFM  - Plan for cytotec induction as this time

## 2019-07-23 NOTE — OB PROVIDER H&P - ATTENDING COMMENTS
Patient seen and examined with me.  Agree with details of resident note.   at 40+ weeks for induction of labor.  VSS.  FHT reassuring.  No ctx on toco.  VE: L/T/C.  GBS positive.  Will treat with ampicillin.  Plan for cytotec induction.

## 2019-07-23 NOTE — H&P ADULT - HISTORY OF PRESENT ILLNESS
35 yo  at 40w 2d with 9w US (18) presents to L&D for elective induction of labor. Denies vaginal bleeding, LOF and uterine contractions. Examined in the office yesterday and found to be closed, long, posterior.   MIKEL: 19  Pregnancy otherwise uncomplicated.  OB hx: none  Gyn hx: none  PMHx/PSHx: Eustachian tubes as a child   Meds: PNV  Allergies: NKDA

## 2019-07-23 NOTE — OB PROVIDER H&P - NSHPPHYSICALEXAM_GEN_ALL_CORE
Vital Signs Last 24 Hrs  T(C): 37 (23 Jul 2019 18:02), Max: 37 (23 Jul 2019 18:02)  T(F): 98.6 (23 Jul 2019 18:02), Max: 98.6 (23 Jul 2019 18:02)  HR: 78 (23 Jul 2019 18:02) (78 - 78)  BP: 121/72 (23 Jul 2019 18:02) (121/72 - 121/72)  RR: 20 (23 Jul 2019 18:02) (20 - 20)    Abdomen: soft, nontender   SVE: closed in office

## 2019-07-24 RX ORDER — SODIUM CHLORIDE 9 MG/ML
500 INJECTION, SOLUTION INTRAVENOUS ONCE
Refills: 0 | Status: COMPLETED | OUTPATIENT
Start: 2019-07-24 | End: 2019-07-24

## 2019-07-24 RX ORDER — OXYTOCIN 10 UNIT/ML
1 VIAL (ML) INJECTION
Qty: 30 | Refills: 0 | Status: DISCONTINUED | OUTPATIENT
Start: 2019-07-24 | End: 2019-07-27

## 2019-07-24 RX ORDER — OXYTOCIN 10 UNIT/ML
2 VIAL (ML) INJECTION
Qty: 30 | Refills: 0 | Status: DISCONTINUED | OUTPATIENT
Start: 2019-07-24 | End: 2019-07-27

## 2019-07-24 RX ORDER — ONDANSETRON 8 MG/1
4 TABLET, FILM COATED ORAL ONCE
Refills: 0 | Status: COMPLETED | OUTPATIENT
Start: 2019-07-24 | End: 2019-07-24

## 2019-07-24 RX ORDER — SODIUM CHLORIDE 9 MG/ML
1000 INJECTION, SOLUTION INTRAVENOUS
Refills: 0 | Status: DISCONTINUED | OUTPATIENT
Start: 2019-07-24 | End: 2019-07-27

## 2019-07-24 RX ADMIN — Medication 108 GRAM(S): at 19:02

## 2019-07-24 RX ADMIN — Medication 108 GRAM(S): at 06:43

## 2019-07-24 RX ADMIN — SODIUM CHLORIDE 2000 MILLILITER(S): 9 INJECTION, SOLUTION INTRAVENOUS at 16:20

## 2019-07-24 RX ADMIN — SODIUM CHLORIDE 150 MILLILITER(S): 9 INJECTION, SOLUTION INTRAVENOUS at 20:34

## 2019-07-24 RX ADMIN — Medication 108 GRAM(S): at 22:59

## 2019-07-24 RX ADMIN — Medication 2 MILLIUNIT(S)/MIN: at 10:10

## 2019-07-24 RX ADMIN — ONDANSETRON 4 MILLIGRAM(S): 8 TABLET, FILM COATED ORAL at 20:10

## 2019-07-24 RX ADMIN — Medication 108 GRAM(S): at 15:00

## 2019-07-24 RX ADMIN — Medication 108 GRAM(S): at 10:42

## 2019-07-24 RX ADMIN — SODIUM CHLORIDE 125 MILLILITER(S): 9 INJECTION, SOLUTION INTRAVENOUS at 02:43

## 2019-07-24 RX ADMIN — Medication 2 MILLIUNIT(S)/MIN: at 13:51

## 2019-07-24 RX ADMIN — SODIUM CHLORIDE 2000 MILLILITER(S): 9 INJECTION, SOLUTION INTRAVENOUS at 16:15

## 2019-07-24 RX ADMIN — Medication 1 MILLIUNIT(S)/MIN: at 20:34

## 2019-07-24 RX ADMIN — SODIUM CHLORIDE 125 MILLILITER(S): 9 INJECTION, SOLUTION INTRAVENOUS at 20:34

## 2019-07-24 RX ADMIN — Medication 30 MILLILITER(S): at 08:40

## 2019-07-24 RX ADMIN — Medication 108 GRAM(S): at 02:45

## 2019-07-24 NOTE — CHART NOTE - NSCHARTNOTEFT_GEN_A_CORE
Patient seen and examined at bedside.  She is comfortable with her epidural.        Vital Signs Last 24 Hrs  T(C): 36.6 (24 Jul 2019 06:52), Max: 37 (23 Jul 2019 18:02)  T(F): 97.88 (24 Jul 2019 06:52), Max: 98.6 (23 Jul 2019 18:02)  HR: 80 (24 Jul 2019 09:55) (55 - 80)  BP: 100/58 (24 Jul 2019 09:55) (83/46 - 126/70)  BP(mean): --  RR: 17 (24 Jul 2019 06:52) (16 - 20)  SpO2: --    FETAL HEART RATE: 140, moderate variability, + accels, no decels    [place]: ctx q 2 minutes    CERVICAL EXAM: 3/80/-2    Will start pitocin augmentation  Continue current care    Bettina Fine DO

## 2019-07-24 NOTE — CHART NOTE - NSCHARTNOTEFT_GEN_A_CORE
Provider called to patient's bedside. She complains of more rectal pressure.    Vital Signs Last 24 Hrs  T(C): 36.9 (24 Jul 2019 21:53), Max: 37.0 (24 Jul 2019 19:55)  T(F): 98.42 (24 Jul 2019 21:53), Max: 98.6 (24 Jul 2019 19:55)  HR: 67 (24 Jul 2019 22:58) (54 - 83)  BP: 120/62 (24 Jul 2019 22:55) (83/46 - 133/61)  RR: 18 (24 Jul 2019 18:10) (16 - 18)  SpO2: 100% (24 Jul 2019 23:03) (99% - 100%)    SVE: 9.5/80/0  FHT: baseline 115, moderate variability, accels, no decels  [place]: ctx q2-3 min    -Patient requesting top-off of epidural  -CRNA explained a top-off only helps with abdominal pain and not the sensation of pressure  -Continue current management

## 2019-07-24 NOTE — CHART NOTE - NSCHARTNOTEFT_GEN_A_CORE
Patient doing well, resting comfortably     Vital Signs Last 24 Hrs  T(C): 37 (23 Jul 2019 18:02), Max: 37 (23 Jul 2019 18:02)  T(F): 98.6 (23 Jul 2019 18:02), Max: 98.6 (23 Jul 2019 18:02)  HR: 78 (23 Jul 2019 18:02) (78 - 78)  BP: 121/72 (23 Jul 2019 18:02) (121/72 - 121/72)  RR: 20 (23 Jul 2019 18:02) (20 - 20)    FHT: baseline 125, moderate variability, accels present, no decelerations   [place]: ctx irregular     A/P    Patient doing well with cytotec IOL, s/p second dose 40mcg. Will continue current course     Dr. Fine aware

## 2019-07-24 NOTE — CHART NOTE - NSCHARTNOTEFT_GEN_A_CORE
ctsp with recurrent decerations; pitocin d/c'ed    s: pt comfortable    sve:6/100/-2  fhr cat one  ctx q 4-5    a/p fhr improved; will have patient continue to labor without pitocin and re check

## 2019-07-24 NOTE — CHART NOTE - NSCHARTNOTEFT_GEN_A_CORE
07-24-19 @ 12:37  Patient was evaluated at bedside. She was resting comfortably. She does not feel her contractions.    FHT: 140bpm- moderate variability, accels, occasional variable decels, category iI tracing  [place]: Ctxs every 3-5minutes.    Patient given oxygen and position adjusted from reclined to sitting  Will continue to reassess prn.

## 2019-07-24 NOTE — CHART NOTE - NSCHARTNOTEFT_GEN_A_CORE
35 y/o  at 40w3d admitted for eIOL at term.   Patient was examined at bedside, reporting adequate analgesia with epidural.     Vital Signs Last 24 Hrs  T(C): 36.6 (2019 12:10), Max: 37 (2019 18:02)  T(F): 97.88 (2019 12:10), Max: 98.6 (2019 18:02)  HR: 59 (2019 16:11) (55 - 83)  BP: 118/64 (2019 16:11) (83/46 - 126/70)  BP(mean): --  RR: 17 (2019 12:10) (16 - 20)  SpO2: --    FHT: baseline 120, moderate variability, recurrent early/variable decelerations, +accels  [place]: chris irregularly q2-5m  SVE: 5-6/90/-2    IUPC placed for amnioinfusion  During placement patient had a prolonged deceleration  approx 6m  Patient was repositioned with resolution of deceleration     Pitocin IV off for now   Amnioinfusion started     d/w Dr. Billingsley

## 2019-07-24 NOTE — CHART NOTE - NSCHARTNOTEFT_GEN_A_CORE
s: pt comfotable;     sve: 4/80/-2  fhr cat 2 with occasional variable decles  ctx q 3    a/p pt has increased variables whenever pitocin is started; will attempt again; if persistent consider amnioinfusion

## 2019-07-24 NOTE — CHART NOTE - NSCHARTNOTEFT_GEN_A_CORE
s: pt comfortable s/p epidural    sve: 3/80/-2  fhr cat one  ctx q 2-5 irregular    a/p pitocin was d/c'ed secondary to variable decelerations; will restart after observing a little longer

## 2019-07-24 NOTE — CHART NOTE - NSCHARTNOTEFT_GEN_A_CORE
s: pt comfortable    sve: 1/80/-2  fhr cat one  ctx irregular on 50 mcg of cytotec    a/p arom'ed with clear fluid  fse applied

## 2019-07-25 LAB
HCT VFR BLD CALC: 27.9 % — LOW (ref 34.5–45)
HGB BLD-MCNC: 9.3 G/DL — LOW (ref 11.5–15.5)
MEV IGG SER-ACNC: 49 AU/ML — SIGNIFICANT CHANGE UP
MEV IGG+IGM SER-IMP: POSITIVE — SIGNIFICANT CHANGE UP
T PALLIDUM AB TITR SER: NEGATIVE — SIGNIFICANT CHANGE UP

## 2019-07-25 PROCEDURE — 59400 OBSTETRICAL CARE: CPT

## 2019-07-25 RX ORDER — AER TRAVELER 0.5 G/1
1 SOLUTION RECTAL; TOPICAL EVERY 4 HOURS
Refills: 0 | Status: DISCONTINUED | OUTPATIENT
Start: 2019-07-25 | End: 2019-07-27

## 2019-07-25 RX ORDER — GLYCERIN ADULT
1 SUPPOSITORY, RECTAL RECTAL AT BEDTIME
Refills: 0 | Status: DISCONTINUED | OUTPATIENT
Start: 2019-07-25 | End: 2019-07-27

## 2019-07-25 RX ORDER — LANOLIN
1 OINTMENT (GRAM) TOPICAL EVERY 6 HOURS
Refills: 0 | Status: DISCONTINUED | OUTPATIENT
Start: 2019-07-25 | End: 2019-07-27

## 2019-07-25 RX ORDER — IBUPROFEN 200 MG
600 TABLET ORAL EVERY 6 HOURS
Refills: 0 | Status: COMPLETED | OUTPATIENT
Start: 2019-07-25 | End: 2020-06-22

## 2019-07-25 RX ORDER — DOCUSATE SODIUM 100 MG
100 CAPSULE ORAL
Refills: 0 | Status: DISCONTINUED | OUTPATIENT
Start: 2019-07-25 | End: 2019-07-27

## 2019-07-25 RX ORDER — SODIUM CHLORIDE 9 MG/ML
3 INJECTION INTRAMUSCULAR; INTRAVENOUS; SUBCUTANEOUS EVERY 8 HOURS
Refills: 0 | Status: DISCONTINUED | OUTPATIENT
Start: 2019-07-25 | End: 2019-07-27

## 2019-07-25 RX ORDER — OXYTOCIN 10 UNIT/ML
333.33 VIAL (ML) INJECTION
Qty: 20 | Refills: 0 | Status: DISCONTINUED | OUTPATIENT
Start: 2019-07-25 | End: 2019-07-27

## 2019-07-25 RX ORDER — SIMETHICONE 80 MG/1
80 TABLET, CHEWABLE ORAL EVERY 4 HOURS
Refills: 0 | Status: DISCONTINUED | OUTPATIENT
Start: 2019-07-25 | End: 2019-07-27

## 2019-07-25 RX ORDER — OXYCODONE HYDROCHLORIDE 5 MG/1
5 TABLET ORAL
Refills: 0 | Status: DISCONTINUED | OUTPATIENT
Start: 2019-07-25 | End: 2019-07-27

## 2019-07-25 RX ORDER — MAGNESIUM HYDROXIDE 400 MG/1
30 TABLET, CHEWABLE ORAL
Refills: 0 | Status: DISCONTINUED | OUTPATIENT
Start: 2019-07-25 | End: 2019-07-27

## 2019-07-25 RX ORDER — ACETAMINOPHEN 500 MG
975 TABLET ORAL
Refills: 0 | Status: DISCONTINUED | OUTPATIENT
Start: 2019-07-25 | End: 2019-07-27

## 2019-07-25 RX ORDER — HYDROCORTISONE 1 %
1 OINTMENT (GRAM) TOPICAL EVERY 6 HOURS
Refills: 0 | Status: DISCONTINUED | OUTPATIENT
Start: 2019-07-25 | End: 2019-07-27

## 2019-07-25 RX ORDER — KETOROLAC TROMETHAMINE 30 MG/ML
30 SYRINGE (ML) INJECTION ONCE
Refills: 0 | Status: DISCONTINUED | OUTPATIENT
Start: 2019-07-25 | End: 2019-07-25

## 2019-07-25 RX ORDER — PRAMOXINE HYDROCHLORIDE 150 MG/15G
1 AEROSOL, FOAM RECTAL EVERY 4 HOURS
Refills: 0 | Status: DISCONTINUED | OUTPATIENT
Start: 2019-07-25 | End: 2019-07-27

## 2019-07-25 RX ORDER — BENZOCAINE 10 %
1 GEL (GRAM) MUCOUS MEMBRANE EVERY 6 HOURS
Refills: 0 | Status: DISCONTINUED | OUTPATIENT
Start: 2019-07-25 | End: 2019-07-27

## 2019-07-25 RX ORDER — IBUPROFEN 200 MG
600 TABLET ORAL EVERY 6 HOURS
Refills: 0 | Status: DISCONTINUED | OUTPATIENT
Start: 2019-07-25 | End: 2019-07-27

## 2019-07-25 RX ORDER — TETANUS TOXOID, REDUCED DIPHTHERIA TOXOID AND ACELLULAR PERTUSSIS VACCINE, ADSORBED 5; 2.5; 8; 8; 2.5 [IU]/.5ML; [IU]/.5ML; UG/.5ML; UG/.5ML; UG/.5ML
0.5 SUSPENSION INTRAMUSCULAR ONCE
Refills: 0 | Status: DISCONTINUED | OUTPATIENT
Start: 2019-07-25 | End: 2019-07-27

## 2019-07-25 RX ORDER — DIPHENHYDRAMINE HCL 50 MG
25 CAPSULE ORAL EVERY 6 HOURS
Refills: 0 | Status: DISCONTINUED | OUTPATIENT
Start: 2019-07-25 | End: 2019-07-27

## 2019-07-25 RX ORDER — DIBUCAINE 1 %
1 OINTMENT (GRAM) RECTAL EVERY 6 HOURS
Refills: 0 | Status: DISCONTINUED | OUTPATIENT
Start: 2019-07-25 | End: 2019-07-27

## 2019-07-25 RX ORDER — OXYCODONE HYDROCHLORIDE 5 MG/1
5 TABLET ORAL ONCE
Refills: 0 | Status: DISCONTINUED | OUTPATIENT
Start: 2019-07-25 | End: 2019-07-27

## 2019-07-25 RX ADMIN — Medication 1 TABLET(S): at 13:37

## 2019-07-25 RX ADMIN — Medication 30 MILLIGRAM(S): at 02:56

## 2019-07-25 RX ADMIN — Medication 30 MILLIGRAM(S): at 03:12

## 2019-07-25 RX ADMIN — SODIUM CHLORIDE 3 MILLILITER(S): 9 INJECTION INTRAMUSCULAR; INTRAVENOUS; SUBCUTANEOUS at 06:16

## 2019-07-25 RX ADMIN — Medication 600 MILLIGRAM(S): at 14:35

## 2019-07-25 RX ADMIN — Medication 600 MILLIGRAM(S): at 13:37

## 2019-07-25 RX ADMIN — Medication 1000 MILLIUNIT(S)/MIN: at 02:57

## 2019-07-25 NOTE — OB PROVIDER DELIVERY SUMMARY - NSPROVIDERDELIVERYNOTE_OBGYN_ALL_OB_FT
Provider called to patient's room because patient was feeling more rectal pressure. She was found to be fully dilated and at 0 station. She pushed for about 2 hours and brought the head to +2 station. Dr. Billingsley was called into the room. She pushed effectively for 5  more minutes. In conjunction with maternal effort, she delivered a viable male infant over intact perineum, no nuchal cord. Placenta delivered intact. Perineum and vagina were inspected, a first degree midline laceration was noted and repaired, a hemostatic right labial 1st degree laceration was noted that did not need to be repaired. Excellent hemostasis obtained. . Apgars 9/9

## 2019-07-25 NOTE — OB PROVIDER DELIVERY SUMMARY - NSPPHNORISK_OBGYN_ALL_OB
2018  EMPLOYEE INFORMATION: EMPLOYER INFORMATION:   NAME: Mendoza [employer]   : 1966 [phone]   DATE OF INJURY/EVENT: 2018           [facility]   Treating Provider: NORAH Kumar  Time In:  9:06 AM Time Out:  9:52 AM      DIAGNOSIS:   1. Wound cellulitis      STATUS: This injury is determined to be WORK RELATED.    RETURN TO WORK:  Employee may return to work with restrictions.     Return Date: 2018            RESTRICTIONS:   Restrictions are to be followed at work and at home.  Restrictions are in effect until next follow-up visit.  Alternate sitting standing type work, elevate leg as needed throughout the day  Keep wound covered, clean and dry    TREATMENT PLAN:  Medications for this injury/condition:   Clindamycin 300 mg t.i.d. ×10 days  Referral/Consult: follow-up with wound care   Diagnostic Testing:   None         Instructions:   Follow-up with occupational medicine  after wound care visit    NEXT RETURN VISIT: [facility] on  @ 9:00 a.m.  Thank you for the privilege of providing medical care for this injury/condition.  If there are any questions, please call the occupational health clinic at Dept: [phone].Electronically signed on 2018 at 9:52 AM by: NORAH Kumar   [facility]  On 2018, Terese MCCULLOUGH scribed the services personally performed by NORAH Kumar  The physician below agrees with the restrictions placed on the patient by the provider above.  Noble Gil DO       In my judgment no risk for PPH has been identified at this time.

## 2019-07-26 ENCOUNTER — TRANSCRIPTION ENCOUNTER (OUTPATIENT)
Age: 34
End: 2019-07-26

## 2019-07-26 RX ORDER — DOCUSATE SODIUM 100 MG
1 CAPSULE ORAL
Qty: 60 | Refills: 0
Start: 2019-07-26 | End: 2019-08-24

## 2019-07-26 RX ORDER — IBUPROFEN 200 MG
1 TABLET ORAL
Qty: 56 | Refills: 0
Start: 2019-07-26 | End: 2019-08-08

## 2019-07-26 RX ORDER — DOCUSATE SODIUM 100 MG
1 CAPSULE ORAL
Qty: 0 | Refills: 0 | DISCHARGE
Start: 2019-07-26

## 2019-07-26 RX ADMIN — Medication 600 MILLIGRAM(S): at 18:54

## 2019-07-26 RX ADMIN — Medication 1 TABLET(S): at 13:02

## 2019-07-26 RX ADMIN — Medication 600 MILLIGRAM(S): at 08:10

## 2019-07-26 RX ADMIN — Medication 600 MILLIGRAM(S): at 18:20

## 2019-07-26 RX ADMIN — Medication 600 MILLIGRAM(S): at 09:00

## 2019-07-26 NOTE — DISCHARGE NOTE OB - MEDICATION SUMMARY - MEDICATIONS TO TAKE
I will START or STAY ON the medications listed below when I get home from the hospital:    ibuprofen 600 mg oral tablet  -- 1 tab(s) by mouth every 6 hours  -- Indication: For pain    docusate sodium 100 mg oral capsule  -- 1 cap(s) by mouth 2 times a day, As needed, For stool softening  -- Indication: For stool stoftner

## 2019-07-26 NOTE — PROGRESS NOTE ADULT - ASSESSMENT
A/P:  Patient is a 33yo  now PPD#1 s/p spontaneous vaginal delivery    -Stable  -Voiding, tolerating PO, bowel function nml   -Advance care as tolerated   -Continue routine postpartum/postoperative care and education.  -Healthy male infant, desires circumcision, planned for today

## 2019-07-26 NOTE — PROGRESS NOTE ADULT - SUBJECTIVE AND OBJECTIVE BOX
Patient is a 35yo  now PPD#1 s/p spontaneous vaginal delivery    S:    The patient has no complaints.  Pain controlled with current medications.   She is ambulating without difficulty and tolerating PO   + flatus/-BM/+voiding     O:    T(C): 36.9 (19 @ 20:45), Max: 36.9 (19 @ 08:23)  HR: 63 (19 @ 20:45) (56 - 63)  BP: 103/56 (19 @ 20:45) (95/58 - 103/56)  RR: 18 (19 @ 20:45) (18 - 18)  SpO2: 96% (19 @ 20:45) (96% - 96%)    Breast: nontender, non-engorged   Abdomen:  soft, non-tender, non-distended, +bowel sounds.  Uterus:  Fundus firm below umbilicus  VE:  +lochia  Ext:  Non-tender.                          9.3    x     )-----------( x        ( 2019 16:46 )             27.9

## 2019-07-26 NOTE — DISCHARGE NOTE OB - MATERIALS PROVIDED
[facility] [place] Screening Program/Breastfeeding Log/Back To Sleep Handout/Breastfeeding Guide and Packet/Vaccinations/[facility] Hearing Screen Program/Birth Certificate Instructions/Discharge Medication Information for Patients and Families Pocket Guide/  Immunization Record/Shaken Baby Prevention Handout/Breastfeeding Mother’s Support Group Information/Guide to Postpartum Care

## 2019-07-26 NOTE — DISCHARGE NOTE OB - CARE PLAN
Principal Discharge DX:	 (normal spontaneous vaginal delivery)  Goal:	Healthy mother and baby  Assessment and plan of treatment:	-Please take ibuprofen as needed for pain as prescribed.   -Nothing per vagina for 6 weeks (including no sex, no tampons, and no douching).  -Please call the office for a follow up appointment in 2 weeks for your postpartum check at [phone]  -Please call the office sooner if you have heavy vaginal bleeding, severe abdominal pain, or fever.

## 2019-07-26 NOTE — DISCHARGE NOTE OB - CARE PROVIDER_API CALL
Román Billingsley)  Obstetrics and Gynecology  [address] [place] [address]  Phone: [phone]  Fax: [phone]  Follow Up Time:

## 2019-07-26 NOTE — DISCHARGE NOTE OB - PATIENT PORTAL LINK FT
You can access the [facility] Patient Portal, offered by [facility], by registering with the following website: [email or web address]

## 2019-07-26 NOTE — DISCHARGE NOTE OB - PLAN OF CARE
Healthy mother and baby -Please take ibuprofen as needed for pain as prescribed.   -Nothing per vagina for 6 weeks (including no sex, no tampons, and no douching).  -Please call the office for a follow up appointment in 2 weeks for your postpartum check at [phone]  -Please call the office sooner if you have heavy vaginal bleeding, severe abdominal pain, or fever.

## 2019-07-27 VITALS
TEMPERATURE: 98 F | RESPIRATION RATE: 18 BRPM | SYSTOLIC BLOOD PRESSURE: 108 MMHG | DIASTOLIC BLOOD PRESSURE: 64 MMHG | HEART RATE: 67 BPM

## 2019-07-27 PROCEDURE — 86765 RUBEOLA ANTIBODY: CPT

## 2019-07-27 PROCEDURE — 59050 FETAL MONITOR W/REPORT: CPT

## 2019-07-27 PROCEDURE — 81001 URINALYSIS AUTO W/SCOPE: CPT

## 2019-07-27 PROCEDURE — 85014 HEMATOCRIT: CPT

## 2019-07-27 PROCEDURE — 36415 COLL VENOUS BLD VENIPUNCTURE: CPT

## 2019-07-27 PROCEDURE — 85027 COMPLETE CBC AUTOMATED: CPT

## 2019-07-27 PROCEDURE — 86850 RBC ANTIBODY SCREEN: CPT

## 2019-07-27 PROCEDURE — 86780 TREPONEMA PALLIDUM: CPT

## 2019-07-27 PROCEDURE — 85018 HEMOGLOBIN: CPT

## 2019-07-27 PROCEDURE — 76815 OB US LIMITED FETUS(S): CPT

## 2019-07-27 PROCEDURE — 86901 BLOOD TYPING SEROLOGIC RH(D): CPT

## 2019-07-27 PROCEDURE — 86900 BLOOD TYPING SEROLOGIC ABO: CPT

## 2019-07-27 RX ADMIN — Medication 600 MILLIGRAM(S): at 09:44

## 2019-07-27 RX ADMIN — Medication 600 MILLIGRAM(S): at 08:44

## 2019-07-27 NOTE — PROGRESS NOTE ADULT - ASSESSMENT
Patient is a 33yo  now PPD#2 s/p spontaneous vaginal delivery    -Stable  -Voiding, tolerating PO, bowel function nml   -Advance care as tolerated   -Continue routine postpartum/postoperative care and education.  -Healthy male infant, got circumcision yesterday Patient is a 33yo  now PPD#2 s/p spontaneous vaginal delivery    -Stable  -Voiding, tolerating PO, bowel function nml   -Advance care as tolerated   -Continue routine postpartum/postoperative care and education.  -Healthy male infant, got circumcision yesterday  -Stable for discharge home pending attending approval

## 2019-07-27 NOTE — PROGRESS NOTE ADULT - SUBJECTIVE AND OBJECTIVE BOX
Patient is a 33yo  now PPD#2 s/p spontaneous vaginal delivery    S:    The patient has no complaints.  Pain controlled with current medications.   She is ambulating without difficulty and tolerating PO   + flatus/-BM/+voiding  Desires to go home today     O:    T(C): 36.9 (19 @ 20:46), Max: 36.9 (19 @ 20:46)  HR: 63 (19 @ 20:46) (63 - 73)  BP: 120/75 (19 @ 20:46) (107/69 - 120/75)  RR: 18 (19 @ 20:46) (18 - 18)  SpO2: 97% (19 @ 20:46) (97% - 97%)    Breast: nontender, non-engorged   Abdomen:  soft, non-tender, non-distended, +bowel sounds.  Uterus:  Fundus firm below umbilicus  VE:  +lochia  Ext:  Non-tender.                          9.3    x     )-----------( x        ( 2019 16:46 )             27.9

## 2019-08-29 ENCOUNTER — INBOUND DOCUMENT (OUTPATIENT)
Age: 34
End: 2019-08-29

## 2019-09-16 ENCOUNTER — APPOINTMENT (OUTPATIENT)
Dept: OBGYN | Facility: CLINIC | Age: 34
End: 2019-09-16
Payer: COMMERCIAL

## 2019-09-16 VITALS
WEIGHT: 158 LBS | HEIGHT: 68 IN | SYSTOLIC BLOOD PRESSURE: 120 MMHG | DIASTOLIC BLOOD PRESSURE: 60 MMHG | BODY MASS INDEX: 23.95 KG/M2

## 2019-09-16 PROCEDURE — 0503F POSTPARTUM CARE VISIT: CPT

## 2019-09-16 NOTE — HISTORY OF PRESENT ILLNESS
[Postpartum Follow Up] : postpartum follow up [Complications:___] : no complications [] : delivered by vaginal delivery [Breastfeeding] : not currently nursing [BreastFeeding Problems] : breastfeeding problems [S/Sx PP Depression] : no signs/symptoms of postpartum depression [Erythema] : not erythematous [Back to Normal] : is back to normal in size [Mild] : mild vaginal bleeding [Normal] : the vagina was normal [Cervix Sample Taken] : cervical sample not taken for a Pap smear [Not Done] : Examination of breasts not done [Doing Well] : is doing well [Excellent Pain Control] : has excellent pain control [No Sign of Infection] : is showing no signs of infection [None] : None [de-identified] : denies depression, declines contraceptives

## 2019-09-18 LAB — HPV HIGH+LOW RISK DNA PNL CVX: NOT DETECTED

## 2019-09-26 LAB — CYTOLOGY CVX/VAG DOC THIN PREP: NORMAL

## 2020-06-09 ENCOUNTER — APPOINTMENT (OUTPATIENT)
Dept: OBGYN | Facility: CLINIC | Age: 35
End: 2020-06-09
Payer: COMMERCIAL

## 2020-06-09 VITALS
HEIGHT: 68 IN | WEIGHT: 142 LBS | DIASTOLIC BLOOD PRESSURE: 70 MMHG | BODY MASS INDEX: 21.52 KG/M2 | SYSTOLIC BLOOD PRESSURE: 110 MMHG

## 2020-06-09 LAB — HCG UR QL: POSITIVE

## 2020-06-09 PROCEDURE — 76817 TRANSVAGINAL US OBSTETRIC: CPT

## 2020-06-09 PROCEDURE — 99213 OFFICE O/P EST LOW 20 MIN: CPT | Mod: 25

## 2020-06-09 PROCEDURE — 81025 URINE PREGNANCY TEST: CPT

## 2020-06-09 NOTE — PROCEDURE
[Yolk Sac] : yolk sac present [Intrauterine Pregnancy] : intrauterine pregnancy [Date: ___] : EDC: [unfilled] [Current GA by Sonogram: ___ (wks)] : Current GA by Sonogram: [unfilled]Uwks [___ day(s)] : [unfilled] days [FreeTextEntry1] : NCWD

## 2020-07-01 ENCOUNTER — APPOINTMENT (OUTPATIENT)
Dept: OBGYN | Facility: CLINIC | Age: 35
End: 2020-07-01
Payer: COMMERCIAL

## 2020-07-01 ENCOUNTER — APPOINTMENT (OUTPATIENT)
Dept: ANTEPARTUM | Facility: CLINIC | Age: 35
End: 2020-07-01
Payer: COMMERCIAL

## 2020-07-01 ENCOUNTER — ASOB RESULT (OUTPATIENT)
Age: 35
End: 2020-07-01

## 2020-07-01 VITALS
DIASTOLIC BLOOD PRESSURE: 68 MMHG | HEART RATE: 83 BPM | WEIGHT: 144 LBS | HEIGHT: 68 IN | SYSTOLIC BLOOD PRESSURE: 94 MMHG | BODY MASS INDEX: 21.82 KG/M2

## 2020-07-01 PROCEDURE — 76817 TRANSVAGINAL US OBSTETRIC: CPT | Mod: 59

## 2020-07-01 PROCEDURE — 0501F PRENATAL FLOW SHEET: CPT

## 2020-07-13 ENCOUNTER — APPOINTMENT (OUTPATIENT)
Dept: ANTEPARTUM | Facility: CLINIC | Age: 35
End: 2020-07-13
Payer: COMMERCIAL

## 2020-07-13 ENCOUNTER — ASOB RESULT (OUTPATIENT)
Age: 35
End: 2020-07-13

## 2020-07-13 PROCEDURE — 36416 COLLJ CAPILLARY BLOOD SPEC: CPT

## 2020-07-13 PROCEDURE — 76813 OB US NUCHAL MEAS 1 GEST: CPT

## 2020-07-16 LAB
1ST TRIMESTER DATA: NORMAL
ADDENDUM DOC: NORMAL
AFP PNL SERPL: NORMAL
AFP SERPL-ACNC: NORMAL
CLINICAL BIOCHEMIST REVIEW: NORMAL
FREE BETA HCG 1ST TRIMESTER: NORMAL
Lab: NORMAL
NASAL BONE: PRESENT
NOTES NTD: NORMAL
NT: NORMAL
PAPP-A SERPL-ACNC: NORMAL
TRISOMY 18/3: NORMAL

## 2020-07-22 ENCOUNTER — LABORATORY RESULT (OUTPATIENT)
Age: 35
End: 2020-07-22

## 2020-07-30 ENCOUNTER — APPOINTMENT (OUTPATIENT)
Dept: OBGYN | Facility: CLINIC | Age: 35
End: 2020-07-30
Payer: COMMERCIAL

## 2020-07-30 VITALS
HEIGHT: 68 IN | WEIGHT: 146 LBS | DIASTOLIC BLOOD PRESSURE: 65 MMHG | BODY MASS INDEX: 22.13 KG/M2 | SYSTOLIC BLOOD PRESSURE: 99 MMHG

## 2020-07-30 PROCEDURE — 0502F SUBSEQUENT PRENATAL CARE: CPT

## 2020-08-20 ENCOUNTER — APPOINTMENT (OUTPATIENT)
Dept: MATERNAL FETAL MEDICINE | Facility: CLINIC | Age: 35
End: 2020-08-20

## 2020-08-20 DIAGNOSIS — B00.2 HERPESVIRAL GINGIVOSTOMATITIS AND PHARYNGOTONSILLITIS: ICD-10-CM

## 2020-08-20 DIAGNOSIS — Z32.01 ENCOUNTER FOR PREGNANCY TEST, RESULT POSITIVE: ICD-10-CM

## 2020-08-20 DIAGNOSIS — Z01.419 ENCOUNTER FOR GYNECOLOGICAL EXAMINATION (GENERAL) (ROUTINE) W/OUT ABNORMAL FINDINGS: ICD-10-CM

## 2020-08-20 DIAGNOSIS — Z80.41 FAMILY HISTORY OF MALIGNANT NEOPLASM OF OVARY: ICD-10-CM

## 2020-08-20 DIAGNOSIS — D25.9 LEIOMYOMA OF UTERUS, UNSPECIFIED: ICD-10-CM

## 2020-08-20 DIAGNOSIS — N94.89 OTHER SPECIFIED CONDITIONS ASSOCIATED WITH FEMALE GENITAL ORGANS AND MENSTRUAL CYCLE: ICD-10-CM

## 2020-08-20 DIAGNOSIS — Z87.42 PERSONAL HISTORY OF OTHER DISEASES OF THE FEMALE GENITAL TRACT: ICD-10-CM

## 2020-08-27 ENCOUNTER — APPOINTMENT (OUTPATIENT)
Dept: OBGYN | Facility: CLINIC | Age: 35
End: 2020-08-27
Payer: COMMERCIAL

## 2020-08-27 ENCOUNTER — NON-APPOINTMENT (OUTPATIENT)
Age: 35
End: 2020-08-27

## 2020-08-27 VITALS — BODY MASS INDEX: 22.13 KG/M2 | HEIGHT: 68 IN | WEIGHT: 146 LBS

## 2020-08-27 VITALS
HEIGHT: 68 IN | WEIGHT: 150 LBS | BODY MASS INDEX: 22.73 KG/M2 | SYSTOLIC BLOOD PRESSURE: 90 MMHG | DIASTOLIC BLOOD PRESSURE: 56 MMHG

## 2020-08-27 PROBLEM — Z87.42 HISTORY OF AMENORRHEA: Status: RESOLVED | Noted: 2018-12-04 | Resolved: 2020-08-27

## 2020-08-27 PROBLEM — B00.2 ORAL HERPES: Status: RESOLVED | Noted: 2019-04-15 | Resolved: 2020-08-27

## 2020-08-27 PROBLEM — Z01.419 VISIT FOR GYNECOLOGIC EXAMINATION: Status: RESOLVED | Noted: 2017-08-22 | Resolved: 2020-08-27

## 2020-08-27 PROBLEM — D25.9 FIBROID, UTERINE: Status: ACTIVE | Noted: 2020-08-27

## 2020-08-27 PROBLEM — Z32.01 PREGNANCY CONFIRMED BY POSITIVE URINE TEST: Status: RESOLVED | Noted: 2020-06-09 | Resolved: 2020-08-27

## 2020-08-27 PROBLEM — Z80.41 FAMILY HISTORY OF MALIGNANT NEOPLASM OF OVARY: Status: ACTIVE | Noted: 2017-08-22

## 2020-08-27 PROBLEM — N94.89 SUPPRESSED PERIODS: Status: RESOLVED | Noted: 2018-12-04 | Resolved: 2020-08-27

## 2020-08-27 PROCEDURE — 0502F SUBSEQUENT PRENATAL CARE: CPT

## 2020-08-27 RX ORDER — ASPIRIN 81 MG
81 TABLET, DELAYED RELEASE (ENTERIC COATED) ORAL
Refills: 0 | Status: ACTIVE | COMMUNITY

## 2020-09-03 ENCOUNTER — APPOINTMENT (OUTPATIENT)
Dept: MATERNAL FETAL MEDICINE | Facility: CLINIC | Age: 35
End: 2020-09-03
Payer: COMMERCIAL

## 2020-09-03 DIAGNOSIS — O09.522 SUPERVISION OF ELDERLY MULTIGRAVIDA, SECOND TRIMESTER: ICD-10-CM

## 2020-09-03 DIAGNOSIS — O09.899 ABNORMAL HEMATOLOGICAL FINDING ON ANTENATAL SCREENING OF MOTHER: ICD-10-CM

## 2020-09-03 DIAGNOSIS — Z3A.19 19 WEEKS GESTATION OF PREGNANCY: ICD-10-CM

## 2020-09-03 DIAGNOSIS — O28.0 ABNORMAL HEMATOLOGICAL FINDING ON ANTENATAL SCREENING OF MOTHER: ICD-10-CM

## 2020-09-03 PROCEDURE — 99213 OFFICE O/P EST LOW 20 MIN: CPT | Mod: 95

## 2020-09-03 NOTE — DISCUSSION/SUMMARY
[FreeTextEntry1] : The patient is a 35-year-old -0-0-1 having telehealth consultation after verbal consent given at approximately 19 weeks for Low JOSIAH-A at the 1st percentile.  Her obstetrical history is significant for delivery in 2019 of a liveborn male  weighing 7 pounds 1 ounce delivered at term by normal spontaneous vaginal delivery.  No problems or complications were noted with that pregnancy.\par \par Ultra-Screen evaluation was performed on  and revealed normal risk for fetal aneuploidy.  The JOSIAH-A was low at the 1st percentile.  Vital signs on  revealed a blood pressure of 90/56 and maternal weight was 150 pounds consistent with a BMI of 22.81 kg.\par \par Management of her pregnancy was discussed the significance of a Low JOSIAH-A was discussed.   Amber understands that her pregnancy has increased risk for loss prior to 24 weeks, poor fetal growth and preeclampsia.  She has been started on low-dose aspirin 81 mg once a day and was advised to increase to twice a day throughout the pregnancy.  She has a comprehensive ultrasound scheduled next week.  Sequential blood work will be performed at that time.  Serial growth scans done monthly beginning at 24 weeks and going forward will be recommended due to her Low JOSIAH-A, Umbilical artery doppler studies will also be performed.  The patient was advised that this is a common finding and was reassured that the likely outcome of this pregnancy would be normal.  At this time no other changes in her current medical management are indicated.  All of the above was discussed with the patient all of her questions were answered.\par \par Patient's family, medical and surgical history are noncontributory.  She has no known allergies to medications and denies alcohol tobacco or drug use.\par \par I spent a total of 15 minutes via telehealth via face time with the patient located at home and done from our [place] office of which greater than 50% was counseling and coordinating care.\par \par Recommendations;\par \par 1.  Comprehensive ultrasound is scheduled in 1 week.\par 2.  Serial growth scans with umbilical artery doppler study done monthly beginning at 24 weeks.\par 3.  Low-dose aspirin 81 mg tablets 1 tab p.o. twice daily.\par 4.  Follow-up maternal-fetal medicine consultation as clinically indicated.

## 2020-09-10 ENCOUNTER — APPOINTMENT (OUTPATIENT)
Dept: ANTEPARTUM | Facility: CLINIC | Age: 35
End: 2020-09-10
Payer: COMMERCIAL

## 2020-09-10 ENCOUNTER — ASOB RESULT (OUTPATIENT)
Age: 35
End: 2020-09-10

## 2020-09-10 DIAGNOSIS — O35.1XX0 MATERNAL CARE FOR (SUSPECTED) CHROMOSOMAL ABNORMALITY IN FETUS, NOT APPLICABLE OR UNSPECIFIED: ICD-10-CM

## 2020-09-10 PROCEDURE — 36415 COLL VENOUS BLD VENIPUNCTURE: CPT

## 2020-09-10 PROCEDURE — 76811 OB US DETAILED SNGL FETUS: CPT

## 2020-09-16 LAB
1ST TRIMESTER DATA: NORMAL
2ND TRIMESTER DATA: NORMAL
ADDENDUM DOC: NORMAL
AFP PNL SERPL: NORMAL
AFP SERPL-ACNC: NORMAL
AFP SERPL-ACNC: NORMAL
B-HCG FREE SERPL-MCNC: NORMAL
CLINICAL BIOCHEMIST REVIEW: NORMAL
FREE BETA HCG 1ST TRIMESTER: NORMAL
INHIBIN A SERPL-MCNC: NORMAL
NASAL BONE: PRESENT
NOTES NTD: NORMAL
NT: NORMAL
PAPP-A SERPL-ACNC: NORMAL
U ESTRIOL SERPL-SCNC: NORMAL

## 2020-09-24 ENCOUNTER — APPOINTMENT (OUTPATIENT)
Dept: OBGYN | Facility: CLINIC | Age: 35
End: 2020-09-24
Payer: COMMERCIAL

## 2020-09-24 VITALS
HEIGHT: 68 IN | SYSTOLIC BLOOD PRESSURE: 98 MMHG | WEIGHT: 157 LBS | DIASTOLIC BLOOD PRESSURE: 60 MMHG | BODY MASS INDEX: 23.79 KG/M2

## 2020-09-24 DIAGNOSIS — Z23 ENCOUNTER FOR IMMUNIZATION: ICD-10-CM

## 2020-09-24 DIAGNOSIS — Z34.90 ENCOUNTER FOR SUPERVISION OF NORMAL PREGNANCY, UNSPECIFIED, UNSPECIFIED TRIMESTER: ICD-10-CM

## 2020-09-24 PROCEDURE — 0502F SUBSEQUENT PRENATAL CARE: CPT

## 2020-09-24 PROCEDURE — 90686 IIV4 VACC NO PRSV 0.5 ML IM: CPT

## 2020-09-24 PROCEDURE — 90471 IMMUNIZATION ADMIN: CPT

## 2020-10-02 LAB
BASOPHILS # BLD AUTO: 0.03 K/UL
BASOPHILS NFR BLD AUTO: 0.4 %
EOSINOPHIL # BLD AUTO: 0.07 K/UL
EOSINOPHIL NFR BLD AUTO: 0.9 %
GLUCOSE 1H P 50 G GLC PO SERPL-MCNC: 103 MG/DL
HCT VFR BLD CALC: 32.7 %
HGB BLD-MCNC: 10.8 G/DL
IMM GRANULOCYTES NFR BLD AUTO: 0.4 %
LYMPHOCYTES # BLD AUTO: 1.49 K/UL
LYMPHOCYTES NFR BLD AUTO: 19.1 %
MAN DIFF?: NORMAL
MCHC RBC-ENTMCNC: 32.5 PG
MCHC RBC-ENTMCNC: 33 GM/DL
MCV RBC AUTO: 98.5 FL
MONOCYTES # BLD AUTO: 0.41 K/UL
MONOCYTES NFR BLD AUTO: 5.3 %
NEUTROPHILS # BLD AUTO: 5.76 K/UL
NEUTROPHILS NFR BLD AUTO: 73.9 %
PLATELET # BLD AUTO: 237 K/UL
RBC # BLD: 3.32 M/UL
RBC # FLD: 13 %
WBC # FLD AUTO: 7.79 K/UL

## 2020-10-08 ENCOUNTER — ASOB RESULT (OUTPATIENT)
Age: 35
End: 2020-10-08

## 2020-10-08 ENCOUNTER — APPOINTMENT (OUTPATIENT)
Dept: ANTEPARTUM | Facility: CLINIC | Age: 35
End: 2020-10-08
Payer: COMMERCIAL

## 2020-10-08 PROCEDURE — 76816 OB US FOLLOW-UP PER FETUS: CPT

## 2020-10-22 ENCOUNTER — APPOINTMENT (OUTPATIENT)
Dept: OBGYN | Facility: CLINIC | Age: 35
End: 2020-10-22
Payer: COMMERCIAL

## 2020-10-22 ENCOUNTER — APPOINTMENT (OUTPATIENT)
Dept: ANTEPARTUM | Facility: CLINIC | Age: 35
End: 2020-10-22

## 2020-10-22 VITALS
HEIGHT: 68 IN | BODY MASS INDEX: 24.71 KG/M2 | WEIGHT: 163 LBS | DIASTOLIC BLOOD PRESSURE: 64 MMHG | SYSTOLIC BLOOD PRESSURE: 98 MMHG

## 2020-10-22 PROCEDURE — 99072 ADDL SUPL MATRL&STAF TM PHE: CPT

## 2020-10-22 PROCEDURE — 0502F SUBSEQUENT PRENATAL CARE: CPT

## 2020-11-05 ENCOUNTER — APPOINTMENT (OUTPATIENT)
Dept: ANTEPARTUM | Facility: CLINIC | Age: 35
End: 2020-11-05
Payer: COMMERCIAL

## 2020-11-05 ENCOUNTER — ASOB RESULT (OUTPATIENT)
Age: 35
End: 2020-11-05

## 2020-11-05 PROCEDURE — 76820 UMBILICAL ARTERY ECHO: CPT

## 2020-11-05 PROCEDURE — 99072 ADDL SUPL MATRL&STAF TM PHE: CPT

## 2020-11-05 PROCEDURE — 76816 OB US FOLLOW-UP PER FETUS: CPT

## 2020-11-05 PROCEDURE — 93976 VASCULAR STUDY: CPT

## 2020-11-12 ENCOUNTER — ASOB RESULT (OUTPATIENT)
Age: 35
End: 2020-11-12

## 2020-11-12 ENCOUNTER — APPOINTMENT (OUTPATIENT)
Dept: ANTEPARTUM | Facility: CLINIC | Age: 35
End: 2020-11-12
Payer: COMMERCIAL

## 2020-11-12 PROCEDURE — 99072 ADDL SUPL MATRL&STAF TM PHE: CPT

## 2020-11-12 PROCEDURE — 93976 VASCULAR STUDY: CPT

## 2020-11-12 PROCEDURE — 76820 UMBILICAL ARTERY ECHO: CPT

## 2020-11-12 PROCEDURE — 76818 FETAL BIOPHYS PROFILE W/NST: CPT

## 2020-11-17 ENCOUNTER — TRANSCRIPTION ENCOUNTER (OUTPATIENT)
Age: 35
End: 2020-11-17

## 2020-11-17 RX ORDER — VALACYCLOVIR 500 MG/1
500 TABLET, FILM COATED ORAL TWICE DAILY
Qty: 14 | Refills: 2 | Status: ACTIVE | COMMUNITY
Start: 2019-04-15 | End: 1900-01-01

## 2020-11-19 ENCOUNTER — APPOINTMENT (OUTPATIENT)
Dept: OBGYN | Facility: CLINIC | Age: 35
End: 2020-11-19
Payer: COMMERCIAL

## 2020-11-19 ENCOUNTER — APPOINTMENT (OUTPATIENT)
Dept: ANTEPARTUM | Facility: CLINIC | Age: 35
End: 2020-11-19
Payer: COMMERCIAL

## 2020-11-19 ENCOUNTER — ASOB RESULT (OUTPATIENT)
Age: 35
End: 2020-11-19

## 2020-11-19 VITALS
HEART RATE: 99 BPM | DIASTOLIC BLOOD PRESSURE: 60 MMHG | WEIGHT: 164 LBS | SYSTOLIC BLOOD PRESSURE: 88 MMHG | HEIGHT: 68 IN | BODY MASS INDEX: 24.86 KG/M2

## 2020-11-19 PROCEDURE — 90471 IMMUNIZATION ADMIN: CPT

## 2020-11-19 PROCEDURE — 93976 VASCULAR STUDY: CPT

## 2020-11-19 PROCEDURE — 90715 TDAP VACCINE 7 YRS/> IM: CPT

## 2020-11-19 PROCEDURE — 76820 UMBILICAL ARTERY ECHO: CPT

## 2020-11-19 PROCEDURE — 76819 FETAL BIOPHYS PROFIL W/O NST: CPT

## 2020-11-19 PROCEDURE — 76816 OB US FOLLOW-UP PER FETUS: CPT

## 2020-11-19 PROCEDURE — 0502F SUBSEQUENT PRENATAL CARE: CPT

## 2020-11-25 ENCOUNTER — APPOINTMENT (OUTPATIENT)
Dept: ANTEPARTUM | Facility: CLINIC | Age: 35
End: 2020-11-25
Payer: COMMERCIAL

## 2020-11-25 ENCOUNTER — ASOB RESULT (OUTPATIENT)
Age: 35
End: 2020-11-25

## 2020-11-25 PROCEDURE — 76815 OB US LIMITED FETUS(S): CPT | Mod: 59

## 2020-11-25 PROCEDURE — 99072 ADDL SUPL MATRL&STAF TM PHE: CPT

## 2020-11-25 PROCEDURE — 93976 VASCULAR STUDY: CPT

## 2020-11-25 PROCEDURE — 76820 UMBILICAL ARTERY ECHO: CPT

## 2020-11-25 PROCEDURE — 76818 FETAL BIOPHYS PROFILE W/NST: CPT

## 2020-11-30 ENCOUNTER — NON-APPOINTMENT (OUTPATIENT)
Age: 35
End: 2020-11-30

## 2020-11-30 ENCOUNTER — APPOINTMENT (OUTPATIENT)
Dept: ANTEPARTUM | Facility: CLINIC | Age: 35
End: 2020-11-30
Payer: COMMERCIAL

## 2020-11-30 ENCOUNTER — APPOINTMENT (OUTPATIENT)
Dept: OBGYN | Facility: CLINIC | Age: 35
End: 2020-11-30
Payer: COMMERCIAL

## 2020-11-30 ENCOUNTER — ASOB RESULT (OUTPATIENT)
Age: 35
End: 2020-11-30

## 2020-11-30 VITALS
WEIGHT: 167 LBS | SYSTOLIC BLOOD PRESSURE: 103 MMHG | HEIGHT: 68 IN | BODY MASS INDEX: 25.31 KG/M2 | DIASTOLIC BLOOD PRESSURE: 68 MMHG

## 2020-11-30 PROCEDURE — 76820 UMBILICAL ARTERY ECHO: CPT

## 2020-11-30 PROCEDURE — 76819 FETAL BIOPHYS PROFIL W/O NST: CPT

## 2020-11-30 PROCEDURE — 0502F SUBSEQUENT PRENATAL CARE: CPT

## 2020-12-03 ENCOUNTER — APPOINTMENT (OUTPATIENT)
Dept: ANTEPARTUM | Facility: CLINIC | Age: 35
End: 2020-12-03

## 2020-12-10 ENCOUNTER — NON-APPOINTMENT (OUTPATIENT)
Age: 35
End: 2020-12-10

## 2020-12-10 ENCOUNTER — APPOINTMENT (OUTPATIENT)
Dept: ANTEPARTUM | Facility: CLINIC | Age: 35
End: 2020-12-10

## 2020-12-10 ENCOUNTER — APPOINTMENT (OUTPATIENT)
Dept: ANTEPARTUM | Facility: CLINIC | Age: 35
End: 2020-12-10
Payer: COMMERCIAL

## 2020-12-10 ENCOUNTER — APPOINTMENT (OUTPATIENT)
Dept: OBGYN | Facility: CLINIC | Age: 35
End: 2020-12-10
Payer: COMMERCIAL

## 2020-12-10 ENCOUNTER — ASOB RESULT (OUTPATIENT)
Age: 35
End: 2020-12-10

## 2020-12-10 VITALS
HEIGHT: 68 IN | DIASTOLIC BLOOD PRESSURE: 68 MMHG | WEIGHT: 168 LBS | HEART RATE: 99 BPM | BODY MASS INDEX: 25.46 KG/M2 | SYSTOLIC BLOOD PRESSURE: 106 MMHG

## 2020-12-10 PROCEDURE — 76820 UMBILICAL ARTERY ECHO: CPT

## 2020-12-10 PROCEDURE — 76819 FETAL BIOPHYS PROFIL W/O NST: CPT

## 2020-12-10 PROCEDURE — 99072 ADDL SUPL MATRL&STAF TM PHE: CPT

## 2020-12-10 PROCEDURE — 0502F SUBSEQUENT PRENATAL CARE: CPT

## 2020-12-10 PROCEDURE — 59025 FETAL NON-STRESS TEST: CPT

## 2020-12-10 PROCEDURE — 76816 OB US FOLLOW-UP PER FETUS: CPT

## 2020-12-10 PROCEDURE — 76821 MIDDLE CEREBRAL ARTERY ECHO: CPT

## 2020-12-10 PROCEDURE — 93976 VASCULAR STUDY: CPT

## 2020-12-16 ENCOUNTER — APPOINTMENT (OUTPATIENT)
Dept: ANTEPARTUM | Facility: CLINIC | Age: 35
End: 2020-12-16
Payer: COMMERCIAL

## 2020-12-16 ENCOUNTER — APPOINTMENT (OUTPATIENT)
Dept: OBGYN | Facility: CLINIC | Age: 35
End: 2020-12-16
Payer: COMMERCIAL

## 2020-12-16 ENCOUNTER — NON-APPOINTMENT (OUTPATIENT)
Age: 35
End: 2020-12-16

## 2020-12-16 ENCOUNTER — ASOB RESULT (OUTPATIENT)
Age: 35
End: 2020-12-16

## 2020-12-16 VITALS
DIASTOLIC BLOOD PRESSURE: 69 MMHG | HEIGHT: 68 IN | BODY MASS INDEX: 25.76 KG/M2 | WEIGHT: 170 LBS | HEART RATE: 108 BPM | SYSTOLIC BLOOD PRESSURE: 109 MMHG

## 2020-12-16 PROCEDURE — 76820 UMBILICAL ARTERY ECHO: CPT

## 2020-12-16 PROCEDURE — 59025 FETAL NON-STRESS TEST: CPT

## 2020-12-16 PROCEDURE — 76819 FETAL BIOPHYS PROFIL W/O NST: CPT

## 2020-12-16 PROCEDURE — 0502F SUBSEQUENT PRENATAL CARE: CPT

## 2020-12-16 PROCEDURE — 99072 ADDL SUPL MATRL&STAF TM PHE: CPT

## 2020-12-16 PROCEDURE — 76815 OB US LIMITED FETUS(S): CPT | Mod: 59

## 2020-12-17 ENCOUNTER — APPOINTMENT (OUTPATIENT)
Dept: ANTEPARTUM | Facility: CLINIC | Age: 35
End: 2020-12-17

## 2020-12-23 ENCOUNTER — APPOINTMENT (OUTPATIENT)
Dept: ANTEPARTUM | Facility: CLINIC | Age: 35
End: 2020-12-23
Payer: COMMERCIAL

## 2020-12-23 ENCOUNTER — APPOINTMENT (OUTPATIENT)
Dept: OBGYN | Facility: CLINIC | Age: 35
End: 2020-12-23
Payer: COMMERCIAL

## 2020-12-23 ENCOUNTER — ASOB RESULT (OUTPATIENT)
Age: 35
End: 2020-12-23

## 2020-12-23 VITALS
BODY MASS INDEX: 25.61 KG/M2 | DIASTOLIC BLOOD PRESSURE: 60 MMHG | HEIGHT: 68 IN | WEIGHT: 169 LBS | SYSTOLIC BLOOD PRESSURE: 110 MMHG

## 2020-12-23 PROCEDURE — 99072 ADDL SUPL MATRL&STAF TM PHE: CPT

## 2020-12-23 PROCEDURE — 0502F SUBSEQUENT PRENATAL CARE: CPT

## 2020-12-23 PROCEDURE — 59025 FETAL NON-STRESS TEST: CPT

## 2020-12-23 PROCEDURE — 76819 FETAL BIOPHYS PROFIL W/O NST: CPT

## 2020-12-24 ENCOUNTER — APPOINTMENT (OUTPATIENT)
Dept: ANTEPARTUM | Facility: CLINIC | Age: 35
End: 2020-12-24

## 2020-12-30 ENCOUNTER — APPOINTMENT (OUTPATIENT)
Dept: ANTEPARTUM | Facility: CLINIC | Age: 35
End: 2020-12-30
Payer: COMMERCIAL

## 2020-12-30 ENCOUNTER — APPOINTMENT (OUTPATIENT)
Dept: ANTEPARTUM | Facility: CLINIC | Age: 35
End: 2020-12-30

## 2020-12-30 ENCOUNTER — ASOB RESULT (OUTPATIENT)
Age: 35
End: 2020-12-30

## 2020-12-30 ENCOUNTER — APPOINTMENT (OUTPATIENT)
Dept: OBGYN | Facility: CLINIC | Age: 35
End: 2020-12-30

## 2020-12-30 PROCEDURE — 76821 MIDDLE CEREBRAL ARTERY ECHO: CPT

## 2020-12-30 PROCEDURE — 99072 ADDL SUPL MATRL&STAF TM PHE: CPT

## 2020-12-30 PROCEDURE — 93976 VASCULAR STUDY: CPT

## 2020-12-30 PROCEDURE — 76820 UMBILICAL ARTERY ECHO: CPT

## 2020-12-30 PROCEDURE — 76818 FETAL BIOPHYS PROFILE W/NST: CPT

## 2020-12-30 PROCEDURE — 76816 OB US FOLLOW-UP PER FETUS: CPT

## 2020-12-31 ENCOUNTER — APPOINTMENT (OUTPATIENT)
Dept: OBGYN | Facility: CLINIC | Age: 35
End: 2020-12-31
Payer: COMMERCIAL

## 2020-12-31 ENCOUNTER — APPOINTMENT (OUTPATIENT)
Dept: ANTEPARTUM | Facility: CLINIC | Age: 35
End: 2020-12-31

## 2020-12-31 VITALS
DIASTOLIC BLOOD PRESSURE: 65 MMHG | SYSTOLIC BLOOD PRESSURE: 92 MMHG | WEIGHT: 171 LBS | BODY MASS INDEX: 25.91 KG/M2 | HEIGHT: 68 IN

## 2020-12-31 PROCEDURE — 0502F SUBSEQUENT PRENATAL CARE: CPT

## 2021-01-03 LAB
GP B STREP DNA SPEC QL NAA+PROBE: NORMAL
GP B STREP DNA SPEC QL NAA+PROBE: NOT DETECTED
SOURCE GBS: NORMAL

## 2021-01-04 ENCOUNTER — ASOB RESULT (OUTPATIENT)
Age: 36
End: 2021-01-04

## 2021-01-04 ENCOUNTER — NON-APPOINTMENT (OUTPATIENT)
Age: 36
End: 2021-01-04

## 2021-01-04 ENCOUNTER — APPOINTMENT (OUTPATIENT)
Dept: ANTEPARTUM | Facility: CLINIC | Age: 36
End: 2021-01-04
Payer: COMMERCIAL

## 2021-01-04 ENCOUNTER — APPOINTMENT (OUTPATIENT)
Dept: OBGYN | Facility: CLINIC | Age: 36
End: 2021-01-04
Payer: COMMERCIAL

## 2021-01-04 VITALS
BODY MASS INDEX: 26.07 KG/M2 | HEIGHT: 68 IN | SYSTOLIC BLOOD PRESSURE: 116 MMHG | DIASTOLIC BLOOD PRESSURE: 67 MMHG | WEIGHT: 172 LBS | HEART RATE: 87 BPM

## 2021-01-04 PROCEDURE — 99072 ADDL SUPL MATRL&STAF TM PHE: CPT

## 2021-01-04 PROCEDURE — 59025 FETAL NON-STRESS TEST: CPT

## 2021-01-04 PROCEDURE — 59426 ANTEPARTUM CARE ONLY: CPT

## 2021-01-04 PROCEDURE — 0502F SUBSEQUENT PRENATAL CARE: CPT

## 2021-01-04 PROCEDURE — 76819 FETAL BIOPHYS PROFIL W/O NST: CPT

## 2021-01-05 ENCOUNTER — OUTPATIENT (OUTPATIENT)
Dept: OUTPATIENT SERVICES | Facility: HOSPITAL | Age: 36
LOS: 1 days | End: 2021-01-05
Payer: COMMERCIAL

## 2021-01-05 DIAGNOSIS — Z20.828 CONTACT WITH AND (SUSPECTED) EXPOSURE TO OTHER VIRAL COMMUNICABLE DISEASES: ICD-10-CM

## 2021-01-05 DIAGNOSIS — H69.93 UNSPECIFIED EUSTACHIAN TUBE DISORDER, BILATERAL: Chronic | ICD-10-CM

## 2021-01-05 LAB — SARS-COV-2 RNA SPEC QL NAA+PROBE: SIGNIFICANT CHANGE UP

## 2021-01-05 PROCEDURE — U0005: CPT

## 2021-01-05 PROCEDURE — U0003: CPT

## 2021-01-06 RX ORDER — OXYTOCIN 10 UNIT/ML
333.33 VIAL (ML) INJECTION
Qty: 20 | Refills: 0 | Status: DISCONTINUED | OUTPATIENT
Start: 2021-01-07 | End: 2021-01-10

## 2021-01-06 RX ORDER — SODIUM CHLORIDE 9 MG/ML
1000 INJECTION, SOLUTION INTRAVENOUS
Refills: 0 | Status: DISCONTINUED | OUTPATIENT
Start: 2021-01-07 | End: 2021-01-09

## 2021-01-06 NOTE — OB PROVIDER H&P - ASSESSMENT
Patient is a 35year old  at 38w who presents to L&D forIOL in setting of IUGR (EFW 7%ile, .MFM recommendation delivery between 38-39w pm )     -Admit to L&D  -Consent  -Admission labs  -IV fluids  -Continuous toco and fetal monitoring   -GBS: Negative, no GBS ppx required   -Analgesia:   -Begin induction     Discussed with   Patient is a 35year old  at 38w admitted to L&D for IOL in setting of IUGR. Cat 1 FHT.      -Admit to L&D  -Consent  -Admission labs  -IV fluids  -Continuous toco and fetal monitoring   -GBS: Negative, no GBS ppx required   -Begin induction: cytotec    Discussed with Dr. Duong

## 2021-01-06 NOTE — OB PROVIDER H&P - HISTORY OF PRESENT ILLNESS
Patient is a 35year old  at 38w who presents to L&D for IUGR (EFW 7%ile, .MFM recommendation delivery between 38-39w pm )     MIKEL: 2021   LMP: 2020     Pregnancy course:   ·	IUGR   ·	Low JOSIAH-A 1st percentile   ·	Fibroid uterus   ·	Right lateral        3.6       4.1       3.9       Subserosal    ()   ·	Short pregnancy interval   ·	AMA   ·	hx GBS in prior pregnancy    CF carrier     Obhx:   G1  FT [id number] 2019   G2     Pmhx: denies   Pshx:  oral surgery   Meds: PNV, ASA 81mg, valcyclovir   Allergies: NKDA   BMI: 26   Ultrasound:  vertex, anterior placenta ()   EFW: 2700g     ABO: A+  COVID: negative  GBS: negative  HIV: nonreactive  Syphilis: negative  Rubella: Immune  HepB: nonreactive  Patient is a 35year old  at 38w who presents to L&D for IUGR (EFW 7%ile, .MFM recommendation delivery between 38-39w pm )  She reports good fetal movement, denies LOF, ctx, and vaginal bleeding.     MIKEL: 2021   LMP: 2020     Pregnancy course:   ·	IUGR   ·	Low JOSIAH-A 1st percentile   ·	Fibroid uterus   ·	Right lateral        3.6       4.1       3.9       Subserosal    ()   ·	Short pregnancy interval   ·	AMA   ·	hx GBS in prior pregnancy  ·	CF carrier     Obhx:   - G1  FT [id number] 2019     Pmhx: denies   Pshx:  oral surgery   Meds: PNV, ASA 81mg, valcyclovir   Allergies: NKDA   BMI: 26   Ultrasound:  vertex, anterior placenta ()   EFW: 2700g     ABO: A+  COVID: negative  GBS: negative  HIV: nonreactive  Syphilis: negative  Rubella: Immune  HepB: nonreactive  Patient is a 35year old  at 38w who presents to L&D for IUGR (EFW 7%ile, .MFM recommendation delivery between 38-39w pm )  She reports good fetal movement, denies LOF, ctx, and vaginal bleeding.     MIKEL: 2021   LMP: 2020     Pregnancy course:   ·	IUGR   ·	Low JOSIAH-A 1st percentile   ·	Fibroid uterus: Right lateral        3.6       4.1       3.9       Subserosal    ()   ·	Short pregnancy interval   ·	AMA   ·	CF carrier     Obhx:   - G1  FT [id number] 2019     Pmhx: denies   Pshx:  oral surgery   Meds: PNV, ASA 81mg, valcyclovir   Allergies: NKDA   BMI: 26   Ultrasound:  vertex, anterior placenta ()   EFW: 2700g     ABO: A+  COVID: negative  GBS: negative  HIV: nonreactive  Syphilis: negative  Rubella: Immune  HepB: nonreactive

## 2021-01-07 ENCOUNTER — APPOINTMENT (OUTPATIENT)
Dept: OBGYN | Facility: CLINIC | Age: 36
End: 2021-01-07

## 2021-01-07 ENCOUNTER — INPATIENT (INPATIENT)
Facility: HOSPITAL | Age: 36
LOS: 2 days | Discharge: HOME | End: 2021-01-10
Payer: COMMERCIAL

## 2021-01-07 ENCOUNTER — APPOINTMENT (OUTPATIENT)
Dept: ANTEPARTUM | Facility: CLINIC | Age: 36
End: 2021-01-07

## 2021-01-07 VITALS
SYSTOLIC BLOOD PRESSURE: 113 MMHG | RESPIRATION RATE: 18 BRPM | HEART RATE: 91 BPM | TEMPERATURE: 98 F | WEIGHT: 293 LBS | DIASTOLIC BLOOD PRESSURE: 67 MMHG | HEIGHT: 68 IN

## 2021-01-07 DIAGNOSIS — Z98.890 OTHER SPECIFIED POSTPROCEDURAL STATES: Chronic | ICD-10-CM

## 2021-01-07 DIAGNOSIS — H69.93 UNSPECIFIED EUSTACHIAN TUBE DISORDER, BILATERAL: Chronic | ICD-10-CM

## 2021-01-07 RX ORDER — CITRIC ACID/SODIUM CITRATE 300-500 MG
30 SOLUTION, ORAL ORAL ONCE
Refills: 0 | Status: COMPLETED | OUTPATIENT
Start: 2021-01-07 | End: 2021-01-08

## 2021-01-07 RX ADMIN — SODIUM CHLORIDE 125 MILLILITER(S): 9 INJECTION, SOLUTION INTRAVENOUS at 22:43

## 2021-01-07 NOTE — OB RN PATIENT PROFILE - PMH
<<----- Click to add NO pertinent Past Medical History No pertinent past medical history     Normal delivery at term  2019

## 2021-01-08 DIAGNOSIS — Z3A.38 38 WEEKS GESTATION OF PREGNANCY: ICD-10-CM

## 2021-01-08 LAB
APPEARANCE UR: CLEAR — SIGNIFICANT CHANGE UP
BASOPHILS # BLD AUTO: 0.02 K/UL — SIGNIFICANT CHANGE UP (ref 0–0.2)
BASOPHILS NFR BLD AUTO: 0.2 % — SIGNIFICANT CHANGE UP (ref 0–2)
BILIRUB UR-MCNC: NEGATIVE — SIGNIFICANT CHANGE UP
BLD GP AB SCN SERPL QL: SIGNIFICANT CHANGE UP
COLOR SPEC: YELLOW — SIGNIFICANT CHANGE UP
DIFF PNL FLD: NEGATIVE — SIGNIFICANT CHANGE UP
EOSINOPHIL # BLD AUTO: 0.04 K/UL — SIGNIFICANT CHANGE UP (ref 0–0.5)
EOSINOPHIL NFR BLD AUTO: 0.4 % — SIGNIFICANT CHANGE UP (ref 0–6)
GLUCOSE UR QL: NEGATIVE MG/DL — SIGNIFICANT CHANGE UP
HCT VFR BLD CALC: 31.2 % — LOW (ref 34.5–45)
HGB BLD-MCNC: 10.7 G/DL — LOW (ref 11.5–15.5)
HIV 1 & 2 AB SERPL IA.RAPID: SIGNIFICANT CHANGE UP
HIV 1+2 AB+HIV1 P24 AG SERPL QL IA: SIGNIFICANT CHANGE UP
IMM GRANULOCYTES NFR BLD AUTO: 0.8 % — SIGNIFICANT CHANGE UP (ref 0–1.5)
KETONES UR-MCNC: ABNORMAL
LEUKOCYTE ESTERASE UR-ACNC: NEGATIVE — SIGNIFICANT CHANGE UP
LYMPHOCYTES # BLD AUTO: 1.67 K/UL — SIGNIFICANT CHANGE UP (ref 1–3.3)
LYMPHOCYTES # BLD AUTO: 17 % — SIGNIFICANT CHANGE UP (ref 13–44)
MCHC RBC-ENTMCNC: 32.9 PG — SIGNIFICANT CHANGE UP (ref 27–34)
MCHC RBC-ENTMCNC: 34.3 GM/DL — SIGNIFICANT CHANGE UP (ref 32–36)
MCV RBC AUTO: 96 FL — SIGNIFICANT CHANGE UP (ref 80–100)
MONOCYTES # BLD AUTO: 0.58 K/UL — SIGNIFICANT CHANGE UP (ref 0–0.9)
MONOCYTES NFR BLD AUTO: 5.9 % — SIGNIFICANT CHANGE UP (ref 2–14)
NEUTROPHILS # BLD AUTO: 7.44 K/UL — HIGH (ref 1.8–7.4)
NEUTROPHILS NFR BLD AUTO: 75.7 % — SIGNIFICANT CHANGE UP (ref 43–77)
NITRITE UR-MCNC: NEGATIVE — SIGNIFICANT CHANGE UP
PH UR: 7 — SIGNIFICANT CHANGE UP (ref 5–8)
PLATELET # BLD AUTO: 198 K/UL — SIGNIFICANT CHANGE UP (ref 150–400)
PROT UR-MCNC: NEGATIVE MG/DL — SIGNIFICANT CHANGE UP
RBC # BLD: 3.25 M/UL — LOW (ref 3.8–5.2)
RBC # FLD: 12.7 % — SIGNIFICANT CHANGE UP (ref 10.3–14.5)
SARS-COV-2 IGG SERPL QL IA: NEGATIVE — SIGNIFICANT CHANGE UP
SARS-COV-2 IGM SERPL IA-ACNC: 0.07 INDEX — SIGNIFICANT CHANGE UP
SP GR SPEC: 1 — LOW (ref 1.01–1.02)
T PALLIDUM AB TITR SER: NEGATIVE — SIGNIFICANT CHANGE UP
UROBILINOGEN FLD QL: NEGATIVE MG/DL — SIGNIFICANT CHANGE UP
WBC # BLD: 9.83 K/UL — SIGNIFICANT CHANGE UP (ref 3.8–10.5)
WBC # FLD AUTO: 9.83 K/UL — SIGNIFICANT CHANGE UP (ref 3.8–10.5)

## 2021-01-08 RX ORDER — OXYTOCIN 10 UNIT/ML
2 VIAL (ML) INJECTION
Qty: 30 | Refills: 0 | Status: DISCONTINUED | OUTPATIENT
Start: 2021-01-08 | End: 2021-01-10

## 2021-01-08 RX ORDER — SODIUM CHLORIDE 9 MG/ML
1000 INJECTION INTRAMUSCULAR; INTRAVENOUS; SUBCUTANEOUS
Refills: 0 | Status: DISCONTINUED | OUTPATIENT
Start: 2021-01-08 | End: 2021-01-10

## 2021-01-08 RX ORDER — SODIUM CHLORIDE 9 MG/ML
1000 INJECTION INTRAMUSCULAR; INTRAVENOUS; SUBCUTANEOUS ONCE
Refills: 0 | Status: COMPLETED | OUTPATIENT
Start: 2021-01-08 | End: 2021-01-08

## 2021-01-08 RX ORDER — OXYTOCIN 10 UNIT/ML
2 VIAL (ML) INJECTION
Qty: 30 | Refills: 0 | Status: DISCONTINUED | OUTPATIENT
Start: 2021-01-08 | End: 2021-01-08

## 2021-01-08 RX ADMIN — Medication 30 MILLILITER(S): at 11:08

## 2021-01-08 RX ADMIN — SODIUM CHLORIDE 125 MILLILITER(S): 9 INJECTION, SOLUTION INTRAVENOUS at 17:48

## 2021-01-08 RX ADMIN — SODIUM CHLORIDE 150 MILLILITER(S): 9 INJECTION INTRAMUSCULAR; INTRAVENOUS; SUBCUTANEOUS at 23:10

## 2021-01-08 RX ADMIN — Medication 2 MILLIUNIT(S)/MIN: at 02:56

## 2021-01-08 RX ADMIN — SODIUM CHLORIDE 125 MILLILITER(S): 9 INJECTION, SOLUTION INTRAVENOUS at 21:49

## 2021-01-08 RX ADMIN — SODIUM CHLORIDE 1000 MILLILITER(S): 9 INJECTION INTRAMUSCULAR; INTRAVENOUS; SUBCUTANEOUS at 21:21

## 2021-01-08 RX ADMIN — SODIUM CHLORIDE 125 MILLILITER(S): 9 INJECTION, SOLUTION INTRAVENOUS at 05:24

## 2021-01-08 RX ADMIN — Medication 2 MILLIUNIT(S)/MIN: at 18:53

## 2021-01-08 NOTE — OB PROVIDER LABOR PROGRESS NOTE - NS_OBIHICONTRACTIONPATTERNDETAILS_OBGYN_ALL_OB_FT
ctx q2-3 min
irregular contractions
ctx q 2-4 minutes
chris irregularly
q2 minutes
chris irregularly

## 2021-01-08 NOTE — OB RN DELIVERY SUMMARY - NS_LABORCHARACTER_OBGYN_ALL_OB
Induction of labor-Medicinal/Augmentation of labor/External electronic FM Induction of labor-AROM/Induction of labor-Medicinal/Augmentation of labor/External electronic FM

## 2021-01-08 NOTE — OB PROVIDER LABOR PROGRESS NOTE - NS_SUBJECTIVE/OBJECTIVE_OBGYN_ALL_OB_FT
Patient resting comfortably with epidural in place
Patient seen and examined at bedside. She is doing well. No complaints at this time.   Vital Signs Last 24 Hrs  T(C): 36.5 (08 Jan 2021 07:11), Max: 36.8 (08 Jan 2021 02:59)  T(F): 97.7 (08 Jan 2021 07:11), Max: 98.24 (08 Jan 2021 02:59)  HR: 81 (08 Jan 2021 07:18) (77 - 91)  BP: 101/60 (08 Jan 2021 07:18) (101/60 - 113/67)  RR: 18 (08 Jan 2021 07:11) (18 - 18)
Patient is laboring in room.    Vital Signs Last 24 Hrs  T(C): 36.6 (08 Jan 2021 19:14), Max: 36.9 (08 Jan 2021 17:37)  T(F): 97.88 (08 Jan 2021 19:14), Max: 98.42 (08 Jan 2021 17:37)  HR: 80 (08 Jan 2021 20:51) (60 - 96)  BP: 112/63 (08 Jan 2021 20:51) (90/55 - 123/61)  RR: 16 (08 Jan 2021 19:14) (16 - 19)  SpO2: 100% (08 Jan 2021 11:33) (98% - 100%)
Patient seen and examined at bedside. She is doing well. No complaints at this time.   Vital Signs Last 24 Hrs  T(C): 36.4 (08 Jan 2021 11:35), Max: 36.8 (08 Jan 2021 02:59)  T(F): 97.52 (08 Jan 2021 11:35), Max: 98.24 (08 Jan 2021 02:59)  HR: 62 (08 Jan 2021 13:07) (60 - 96)  BP: 102/56 (08 Jan 2021 13:07) (98/55 - 119/67)  RR: 18 (08 Jan 2021 11:35) (18 - 18)  SpO2: 100% (08 Jan 2021 11:33) (98% - 100%)
[place] doing well, resting comfortably
Patient resting comfortably with epidural in place
Pt resting comfortable with epidural
Patient feeling rectal pressure with contractions

## 2021-01-08 NOTE — OB PROVIDER LABOR PROGRESS NOTE - NSVAGINALEXAM_OBGYN_ALL_OB_DT
08-Jan-2021 13:19
08-Jan-2021 16:19
08-Jan-2021 21:00
08-Jan-2021 18:11
08-Jan-2021 23:38
08-Jan-2021 09:07

## 2021-01-08 NOTE — OB RN DELIVERY SUMMARY - NS_LABORROOM_OBGYN_ALL_OB_FT
INTERVENTIONAL RADIOLOGY PROGRESS NOTE    Patient: Jamir Lanier Date: 2017  : 1943 Attending: Toro Farooq MD  Pcp Not In System   76year old female   Diagnosis/Comorbidities/Complications:  Patient Active Problem List   Diagnosis   â¢ AP (abdominal pain)   â¢ Vaginal discharge   â¢ Pelvic mass in female   â¢ COPD, severe (CMS/HCC)   â¢ DM2 (diabetes mellitus, type 2) (CMS/HCC)   â¢ CAD (coronary artery disease)   â¢ Anxiety   â¢ Gout   â¢ Respiratory failure, acute and chronic (CMS/HCC)   â¢ Mixed hyperlipidemia   â¢ Hypertension   â¢ Gastroesophageal reflux disease   â¢ Iron deficiency anemia   â¢ Hyperkalemia   â¢ Hyperglycemia   â¢ Hypertensive urgency   â¢ Cardiomyopathy due to hypertension, with heart failure (CMS/HCC)   â¢ COPD exacerbation (CMS/HCC)   â¢ CKD (chronic kidney disease) stage 2, GFR 60-89 ml/min   â¢ Chronic systolic congestive heart failure (CMS/HCC)   â¢ Malnutrition (CMS/HCC)   â¢ Sepsis (CMS/[facility])       Reason for Follow -up :  Left pleural effusion  S/p left chest tube placement, by Dr. Ana Laura Whitney on 2017    Subjective: Tracheostomy. Lying in bed. Lethargic. Physical Exam:  Visit Vitals  /60   Pulse 69   Temp 98.1 Â°F (36.7 Â°C) (Oral)   Resp 19   Ht 5' (1.524 m)   Wt 70.8 kg   SpO2 100%   BMI 30.48 kg/mÂ²       General Appearance:    Alert, cooperative, no distress, appears stated age   Head:    Normocephalic, without obvious abnormality, atraumatic   Neck:   Tracheostomy with 35% vent.    Lungs:     Coarse breath sound bilateral, respirations unlabored    Heart:    Regular rate and rhythm, S1 and S2 normal, no murmur   Abdomen:     Soft, non-tender, bowel sounds active all four quadrants,     no masses, no organomegaly   Neurologic:  sedated, lethargic     Laboratory Results:  Recent Labs      17   0322  17   0352  17   1830  17   0411   SODIUM  145  145   --   144   POTASSIUM  4.4  3.8  3.6  3.8  3.7   BUN  28*  29*   --   28*   CREATININE  0.99*  1.03* --   1.03*   WBC  21.1*  17.2*   --   13.8*   HCT  21.2*  22.5*   --   25.2*   HGB  6.6*  7.1*   --   7.8*   PLT  288  305   --   381   GLUCOSE  68  90   --   74   MG  1.9  2.1  1.7  1.8       Above labs were reviewed.     Scheduled Medications:  Current Facility-Administered Medications   Medication Dose Route Frequency Provider Last Rate Last Dose   â¢ DEXTROSE 50 % IV SOLN Pyxis Override            â¢ furosemide (LASIX) 250 mg in sodium chloride 0.9 % 125 mL infusion   Intravenous Continuous DAVID Chambers 3.8 mL/hr at 12/02/17 0821 7.5 mg/hr at 12/02/17 0821   â¢ alteplase (CATHFLO ACTIVASE) 10 mg in sodium chloride 0.9 % 20 mL for tube administration  10 mg Chest Tube Q12H Shilpa Sarmiento MD   10 mg at 12/01/17 2207    And   â¢ dornase jason (PULMOZYME) 5 mg in sterile water (preservative free) 30 mL for tube administration  5 mg Chest Tube Q12H Shilpa Sarmiento MD   5 mg at 12/02/17 0040   â¢ sodium chloride 0.9% infusion   Intravenous Continuous PRN Justin Miles NP       â¢ albumin human (SPA) 25 % injection 25 g  25 g Intravenous 4 times per day Justin Miles NP 0 mL/hr at 12/01/17 1843 25 g at 12/02/17 0502   â¢ metOLazone (ZAROXOLYN) tablet 10 mg  10 mg PEG Tube TID Darylene Gavel, MD   10 mg at 12/02/17 0824   â¢ metoCLOPramide (REGLAN) oral solution 5 mg  5 mg Per G Tube TID AC Shilpa Sarmiento MD   5 mg at 12/02/17 2195   â¢ cloNIDine (CATAPRES) tablet 0.2 mg  0.2 mg PEG Tube 2 times per day Justin Miles NP   0.2 mg at 12/02/17 3432   â¢ hydrALAZINE (APRESOLINE) injection 10-20 mg  10-20 mg Intravenous Q6H PRN DAVID Mccarty   20 mg at 11/28/17 1857   â¢ meropenem (MERREM) IV syringe 1 g  1 g Intravenous 2 times per day Alanna Patel MD   1 g at 12/02/17 1217   â¢ heparin (porcine) injection 5,000 Units  5,000 Units Subcutaneous 3 times per day DAVID Mccarty   5,000 Units at 12/02/17 0501   â¢ potassium chloride (K-DUR,KLOR-CON) CR tablet 20 mEq  20 mEq Oral Q4H PRN Dean Martinez MD       â¢ potassium chloride (KLOR-CON) packet 20 mEq  20 mEq PEG Tube V2U PRN Dean Martinez MD   20 mEq at 12/01/17 0553   â¢ potassium chloride 20 mEq/100mL IVPB premix  20 mEq Intravenous W9V PRN Dean Martinez MD 0 mL/hr at 11/28/17 2000 20 mEq at 12/02/17 0502   â¢ potassium chloride (K-DUR,KLOR-CON) CR tablet 40 mEq  40 mEq Oral G9I PRN Dean Martinez MD       â¢ potassium chloride (KLOR-CON) packet 40 mEq  40 mEq PEG Tube L4H PRN Dean Martinez MD       â¢ potassium chloride 20 mEq/100mL IVPB premix  40 mEq Intravenous V0L PREUSEBIO Martinez MD       â¢ magnesium sulfate 1 g in dextrose 5% 100 mL IVPB premix  1 g Intravenous Daily PRN Dean Martinez  mL/hr at 12/02/17 0502 1 g at 12/02/17 0502   â¢ magnesium sulfate 2 g in 50 mL premix IVPB  2 g Intravenous Daily PRN Dean Martinez MD   Stopped at 11/26/17 0700   â¢ acetaminophen (TYLENOL) tablet 650 mg  650 mg Oral Q4H PRN Megan Chamorro MD       â¢ albuterol-ipratropium 2.5 mg/0.5 mg (DUONEB) nebulizer solution 3 mL  3 mL Nebulization 4x Daily Resp Megan Chamorro MD   3 mL at 12/02/17 0835   â¢ bisacodyl (DULCOLAX) suppository 10 mg  10 mg Rectal Daily PRN Megan Chamorro MD       â¢ carvedilol (COREG) tablet 12.5 mg  12.5 mg Per G Tube BID  Trina Tinsley NP   12.5 mg at 12/02/17 0824   â¢ dilTIAZem (CARDIZEM) tablet 60 mg  60 mg Per G Tube 3 times per day Megan Chamorro MD   60 mg at 12/02/17 0502   â¢ hydrALAZINE (APRESOLINE) tablet 100 mg  100 mg Per G Tube 2 times per day Trina Tinsley NP   100 mg at 12/02/17 0824   â¢ pantoprazole (PROTONIX) (compounded) suspension 40 mg  40 mg Per G Tube Daily Megan Chamorro MD   40 mg at 12/02/17 9920   â¢ nitroGLYcerin (NITROSTAT) sublingual tablet 0.4 mg  0.4 mg Sublingual Q5 Min PRN Megan Chamorro MD       â¢ chlorhexidine gluconate (PERIDEX) 0.12 % solution 15 mL  15 mL Swish & Spit 2 times per day Megan Chamorro MD   15 mL at 12/02/17 0835    And   â¢ chlorhexidine gluconate (PERIDEX) 0.12 % solution 15 mL  15 mL Swish & Spit PRN Baltazar Chowdary MD       â¢ sodium chloride 0.9% infusion   Intravenous Continuous PRN Baltazar Chowdary MD 10 mL/hr at 12/01/17 2008     â¢ sodium chloride 0.9% infusion   Intravenous Continuous PRN Baltazar Chowdary MD   Stopped at 11/23/17 1600   â¢ insulin regular (human) (HumuLIN R, NovoLIN R) sliding scale injection   Subcutaneous 4 times per day Baltazar Chowdary MD   Stopped at 12/01/17 0002   â¢ dextrose 50 % injection 25 g  25 g Intravenous PRN Baltazar Chowdary MD   12.5 g at 12/02/17 0740   â¢ dextrose 5 % infusion   Intravenous Continuous PRN Baltazar Chowdary MD   Stopped at 11/29/17 2011   â¢ glucagon (GLUCAGEN) injection 1 mg  1 mg Intramuscular PRN Baltazar Chowdary MD       â¢ dextrose (GLUTOSE) 40 % gel 15 g  15 g Oral PRN Baltazar Chowdary MD           Continuous Infusions:  â¢ furosemide (LASIX) 250 mg in sodium chloride 0.9 % 125 mL infusion 7.5 mg/hr (12/02/17 0821)   â¢ sodium chloride 0.9% infusion     â¢ sodium chloride 0.9% infusion 10 mL/hr at 12/01/17 2008   â¢ sodium chloride 0.9% infusion Stopped (11/23/17 1600)   â¢ dextrose 5 % infusion Stopped (11/29/17 2011)       Intake/Output:    Date 12/01/17 0700 - 12/02/17 0659 12/02/17 0700 - 12/03/17 0659   Shift 3539-4876 3841-4882 2694-7743 24 Hour Total 9128-1344 0786-4191 5121-5617 24 Hour Total   I  N  T  A  K  E   I.V.   431 431        Other   35 35        Shift Total   466 466       O  U  T  P  U  T   Urine  3265 400   400    Stool   400 400        Chest Tube  0 180 180          Output (mL) (Chest Tube #1 Pleural Left Mid)  0 180 180        Shift Total  3845 400   400   Weight (kg) 71.9 71.9 70.8 70.8 70.8 70.8 70.8 70.8       Imaging:   Xr Chest Ap Or Pa  FINDINGS:  Â   The cardiomediastinal silhouette appears to be within normal limits. The  pulmonary vasculature is normally distributed.  Tracheostomy tube and 6 right  PICC is unchanged. Subsequent placement of a left pigtail chest tube  overlying the left lower lobe since the prior chest x-ray, similar  positioning when compared to the chest CT. Retrocardiac opacification. Small bilateral pleural effusions, left greater than right. No evidence of  significant pneumothorax, small focus of air is located along the left  inferior lateral chest wall. Impression:  Â   1. Left inferior pigtail chest tube. No significant pleural effusion. 2. Left retrocardiac opacification and small bilateral pleural effusions. Â      Xr Chest Ap Or Pa    Result Date: 11/22/2017  XR CHEST AP OR PA HISTORY: resp distress COMPARISON: November 22, 2017 TECHNIQUE: Single AP, portable view of the chest. FINDINGS: Lordotic positioning. Numerous overlying monitor leads and artifacts are evident. A tracheostomy tube is again visualized. A right PICC remains in place. A tube projects over the right side of the thorax, likely overlying artifact. Interval removal of the small bore left chest tube and interval placement of a larger left chest tube with tip projected at the level of the thoracic apex. The cardiac silhouette and mediastinum are stable. There is again evidence of atherosclerotic vascular disease. The right lung appears unchanged. Persistent density at the right lung base, likely atelectasis and an associated small pleural effusion. Persistent dense opacity involving the left infrahilar region and left base obscuring the left diaphragm, consistent with atelectasis or infiltrate and associated pleural fluid. No definite pneumothorax is identified. IMPRESSION:  The right lung appears unchanged. Persistent density at the right lung base, likely atelectasis and an associated small pleural effusion. Persistent dense opacity involving the left infrahilar region and left base obscuring the left diaphragm, consistent with atelectasis or infiltrate and associated pleural fluid.      Xr Chest Ap Or Pa    Result Date: 11/22/2017  EXAM: AP UPRIGHT PORTABLE CHEST AT 0445 HOURS CLINICAL HISTORY:  Follow-up left empyema. COMPARISON:  Yesterday. FINDINGS: A tracheostomy tube, solitary left chest tube, right-sided PICC line, and postsurgical changes in the right lung apex are redemonstrated. There is persistent dense airspace opacity compatible with a sizable pleural collection and associated compressive atelectasis, unchanged. Right basilar linear opacity is unchanged. There is no evidence for any sizable right pleural effusion. There is no pneumothorax. The heart size remains stable, and there is persistent slight pulmonary vascular congestion. IMPRESSION: No significant interval change. Xr Chest Ap Or Pa    Result Date: 11/21/2017  XR CHEST AP OR PA HISTORY:  Respiratory failure. Sepsis. COMPARISON:  November 20, 2017 TECHNIQUE: AP portable 30 degrees upright study 0833 hours FINDINGS: Tracheostomy tube in good position. External monitor wires are again noted. Right arm approach PICC line with catheter tip in good position the SVC. Small caliber left-sided chest tube remains. No significant caliber pneumothorax. Surgical changes at the right apex are again evident. Heart is not enlarged. Central vascular prominence. Opacity obscuring the left hemidiaphragm and costophrenic angle consistent with pleural effusion with associated pneumonia or atelectasis is unchanged. Slight blunting the right costophrenic angle suggesting small right pleural effusion. Tenting of the right hemidiaphragm is again evident. Right basal hazy opacity which may represent pneumonia or atelectasis is evident. Overall, no significant change. IMPRESSION: No significant change. Us Soft Tissue Abdominal Wall    Addendum Date: 11/22/2017    This is an addendum to the impression. IMPRESSION: 1.  Complex septated collection without significant flow demonstrated on color Doppler within the lesser curvature of the stomach and not obviously indicated with the pancreas. This appears to correspond to the abnormality seen on recent CT. Recommend follow-up imaging with noncontrast MRI. Include B-100 and B-600 DWI sequences. Result Date: 11/22/2017  US SOFT TISSUE ABDOMINAL WALL 11/22/2017 1:39 PM HISTORY: COMPARISON: CT abdomen pelvis 12/6/2014 and 11/20/2017. TECHNIQUE:  Real time gray scale, color Doppler and spectral wave form analysis was performed on the epigastric area. Freeze frame images submitted for review. FINDINGS: Multiple grayscale and color Doppler images of the epigastric area demonstrates collection of predominantly anechoic fluid with thick septations adjacent to the medial aspect of the liver. There is no evidence of internal blood flow on color Doppler imaging. The common bile duct is dilated to 1.5 cm. Mild intrahepatic biliary ductal dilatation is also noted. The stomach has a thickened appearance. IMPRESSION: 1. Complex septated collection without significant flow demonstrated on color Doppler within the lesser curvature of the stomach and not obviously indicating with the pancreas. This appears to correspond to the abnormality seen on recent CT. 2.  Diffuse thickening of the stomach incompletely visualized. Please refer to recent CT. 3.  Biliary ductal prominence is favored to be secondary to postcholecystectomy state. This appears stable when compared to CT from 12/6/2014. I have reviewed the images and agree with the Resident's interpretation. IMPRESSION:  1. Left empyema, s/p  left video-assisted thoracoscopy surgery with drainage of effusion and decortication   2. Persisted loculated left pleural effusion, s/p left chest tube placement by IR (12/1/2017)  3. Chronic hypoxic respiratory failure    Plan/Recommendations:  1.  Patient was discussed with Dr. Paula Evans, he did review the chest x-ray film of today, and he recommended to pulled the catheter back about 2 cm, which was done this AM. The chest tube was pulled bac at the present of RN, and re-dressed with new dressing gauzes. 2. Continue to complete 3 does of tPA/dornase. Will see how much the output after tPA/dornase, expect to increase. 3. Continue AM chest x-ray  4. RT to follow respiratory therapy  5. ABX per ID  6. Thoracic surgery is following      Above outline was discussed with family member, ORLANDO Al PA-C  [phone] (7:00 am - 2:00 pm  Please call [phone] or have the on-call IR physician paged for urgent issues outside of above hours.

## 2021-01-08 NOTE — OB PROVIDER IHI INDUCTION/AUGMENTATION NOTE - NS_CHECKALL_OBGYN_ALL_OB
Order was written/H&P was completed/Contractions pattern was reviewed/FHR was reviewed/Induction / Augmentation was discussed
Order was written/H&P was completed/Contractions pattern was reviewed/FHR was reviewed/Induction / Augmentation was discussed

## 2021-01-08 NOTE — CHART NOTE - NSCHARTNOTEFT_GEN_A_CORE
s: pt comfortable    sve: deferred  fhr cat one  ctx q 2-3 on 14 mu of pitocin    a/p  38 weeks induction for iugr; continue pitocin
s: pt comfortable s/p epidural    sve: 4/50/-2  fhr cat one  ctx q 3 on 16 mu    a/p continue pitocin

## 2021-01-08 NOTE — OB PROVIDER LABOR PROGRESS NOTE - ASSESSMENT
- T cat 1  - Continue pitocin  - Will plan to recheck when patient reports pelvic pressure  - Continue to monitor
[place] is doing well now fully dilated  -T cat 1   -Ctx q 2-4 minutes   -Will begin pushing efforts in the next 30 minutes     Dr. Garza en route. 
[place] is undergoing IOL for FGR now at 38 weeks     [] FHT category 1 now. Previously after cytotec, had intermittent variable decelerations. Since patient is multiparous and pitocin can be discontinued immediately if fetus does not tolerate, we will transition to pitocin. Anticipate . Dr. Duong in agreement with plan 
- T cat 1  - Continue pitocin  - recheck patient in 2-3 hours, consider IUPC if minimal change is made  - Continue to monitor  - d/w Dr. Buck
34yo  at 38 weeks GA here for an induction of labor 2/2 FGR.   -VSS  -Cat 1 tracing  -Celeste irregularly   -Cook placed ()  -Continue with pitocin     Discussed with Dr. Buck.   
- FHT cat 1  - IUPC placed, will increase pitocin if clinically indicated  - Continue to monitor  - Dr. Billingsley/Von made aware
34yo  at 38 weeks GA here for an induction of labor 2/2 FGR.   -VSS  -Cat 1 tracing  -Celeste irregularly   -Cook spontaneously expelled  -AROM@1315, clear   -Continue with pitocin     Discussed with Dr. Buck. 
35 y.o.  at 38 weeks gestation undergoing IOL for FGR, s/p cytotec and on pitocin. Cat 2 FHT. AROM with clear fluid, IUPC in place.     - consider amnioinfusion for reccurrent variable decelerations  - continuous toco, fetal and maternal heart monitoring  - continue pitocin induction    Discussed with Dr. Stevens

## 2021-01-08 NOTE — OB RN DELIVERY SUMMARY - NS_SEPSISRSKCALC_OBGYN_ALL_OB_FT
EOS calculated successfully. EOS Risk Factor: 0.5/1000 live births ([facility] [place] national incidence[place] GA=38w1d; Temp=98.42; ROM=10.8; GBS='Negative'; Antibiotics='No antibiotics or any antibiotics < 2 hrs prior to birth'

## 2021-01-08 NOTE — OB PROVIDER LABOR PROGRESS NOTE - NS_OBIHIFHRDETAILS_OBGYN_ALL_OB_FT
baseline 130s moderate variability
baseline 120s, moderate variability, +accels, -decels
baseline 130, moderate variability, + accels, -decels
moderate variability, accels, no decels, baseline 140  toco q2-3min contractions
baseline 130, moderate variability, + accels, -decels  ctx q2-3 min
baseline 135, moderate variability, +accels, -decels
150 baseline, moderate variability, -accels, +recurrent variable decelerations
cat 1

## 2021-01-09 ENCOUNTER — TRANSCRIPTION ENCOUNTER (OUTPATIENT)
Age: 36
End: 2021-01-09

## 2021-01-09 LAB
HCT VFR BLD CALC: 30.6 % — LOW (ref 34.5–45)
HGB BLD-MCNC: 10.3 G/DL — LOW (ref 11.5–15.5)

## 2021-01-09 PROCEDURE — 59410 OBSTETRICAL CARE: CPT

## 2021-01-09 RX ORDER — LANOLIN
1 OINTMENT (GRAM) TOPICAL EVERY 6 HOURS
Refills: 0 | Status: DISCONTINUED | OUTPATIENT
Start: 2021-01-09 | End: 2021-01-10

## 2021-01-09 RX ORDER — DIPHENHYDRAMINE HCL 50 MG
25 CAPSULE ORAL EVERY 6 HOURS
Refills: 0 | Status: DISCONTINUED | OUTPATIENT
Start: 2021-01-09 | End: 2021-01-10

## 2021-01-09 RX ORDER — SIMETHICONE 80 MG/1
80 TABLET, CHEWABLE ORAL EVERY 4 HOURS
Refills: 0 | Status: DISCONTINUED | OUTPATIENT
Start: 2021-01-09 | End: 2021-01-10

## 2021-01-09 RX ORDER — SODIUM CHLORIDE 9 MG/ML
3 INJECTION INTRAMUSCULAR; INTRAVENOUS; SUBCUTANEOUS EVERY 8 HOURS
Refills: 0 | Status: DISCONTINUED | OUTPATIENT
Start: 2021-01-09 | End: 2021-01-10

## 2021-01-09 RX ORDER — ACETAMINOPHEN 500 MG
975 TABLET ORAL
Refills: 0 | Status: DISCONTINUED | OUTPATIENT
Start: 2021-01-09 | End: 2021-01-10

## 2021-01-09 RX ORDER — HYDROCORTISONE 1 %
1 OINTMENT (GRAM) TOPICAL EVERY 6 HOURS
Refills: 0 | Status: DISCONTINUED | OUTPATIENT
Start: 2021-01-09 | End: 2021-01-10

## 2021-01-09 RX ORDER — IBUPROFEN 200 MG
600 TABLET ORAL EVERY 6 HOURS
Refills: 0 | Status: COMPLETED | OUTPATIENT
Start: 2021-01-09 | End: 2021-12-08

## 2021-01-09 RX ORDER — AER TRAVELER 0.5 G/1
1 SOLUTION RECTAL; TOPICAL EVERY 4 HOURS
Refills: 0 | Status: DISCONTINUED | OUTPATIENT
Start: 2021-01-09 | End: 2021-01-10

## 2021-01-09 RX ORDER — OXYCODONE HYDROCHLORIDE 5 MG/1
5 TABLET ORAL
Refills: 0 | Status: DISCONTINUED | OUTPATIENT
Start: 2021-01-09 | End: 2021-01-10

## 2021-01-09 RX ORDER — IBUPROFEN 200 MG
1 TABLET ORAL
Qty: 56 | Refills: 0
Start: 2021-01-09 | End: 2021-01-22

## 2021-01-09 RX ORDER — ACETAMINOPHEN 500 MG
2 TABLET ORAL
Qty: 112 | Refills: 0
Start: 2021-01-09 | End: 2021-01-22

## 2021-01-09 RX ORDER — TETANUS TOXOID, REDUCED DIPHTHERIA TOXOID AND ACELLULAR PERTUSSIS VACCINE, ADSORBED 5; 2.5; 8; 8; 2.5 [IU]/.5ML; [IU]/.5ML; UG/.5ML; UG/.5ML; UG/.5ML
0.5 SUSPENSION INTRAMUSCULAR ONCE
Refills: 0 | Status: DISCONTINUED | OUTPATIENT
Start: 2021-01-09 | End: 2021-01-10

## 2021-01-09 RX ORDER — KETOROLAC TROMETHAMINE 30 MG/ML
30 SYRINGE (ML) INJECTION ONCE
Refills: 0 | Status: DISCONTINUED | OUTPATIENT
Start: 2021-01-09 | End: 2021-01-10

## 2021-01-09 RX ORDER — MAGNESIUM HYDROXIDE 400 MG/1
30 TABLET, CHEWABLE ORAL
Refills: 0 | Status: DISCONTINUED | OUTPATIENT
Start: 2021-01-09 | End: 2021-01-10

## 2021-01-09 RX ORDER — PRAMOXINE HYDROCHLORIDE 150 MG/15G
1 AEROSOL, FOAM RECTAL EVERY 4 HOURS
Refills: 0 | Status: DISCONTINUED | OUTPATIENT
Start: 2021-01-09 | End: 2021-01-10

## 2021-01-09 RX ORDER — DIBUCAINE 1 %
1 OINTMENT (GRAM) RECTAL EVERY 6 HOURS
Refills: 0 | Status: DISCONTINUED | OUTPATIENT
Start: 2021-01-09 | End: 2021-01-10

## 2021-01-09 RX ORDER — OXYTOCIN 10 UNIT/ML
333.33 VIAL (ML) INJECTION
Qty: 20 | Refills: 0 | Status: DISCONTINUED | OUTPATIENT
Start: 2021-01-09 | End: 2021-01-10

## 2021-01-09 RX ORDER — IBUPROFEN 200 MG
600 TABLET ORAL EVERY 6 HOURS
Refills: 0 | Status: DISCONTINUED | OUTPATIENT
Start: 2021-01-09 | End: 2021-01-10

## 2021-01-09 RX ORDER — OXYCODONE HYDROCHLORIDE 5 MG/1
5 TABLET ORAL ONCE
Refills: 0 | Status: DISCONTINUED | OUTPATIENT
Start: 2021-01-09 | End: 2021-01-10

## 2021-01-09 RX ORDER — BENZOCAINE 10 %
1 GEL (GRAM) MUCOUS MEMBRANE EVERY 6 HOURS
Refills: 0 | Status: DISCONTINUED | OUTPATIENT
Start: 2021-01-09 | End: 2021-01-10

## 2021-01-09 RX ADMIN — Medication 975 MILLIGRAM(S): at 11:26

## 2021-01-09 RX ADMIN — Medication 1000 MILLIUNIT(S)/MIN: at 02:04

## 2021-01-09 RX ADMIN — Medication 975 MILLIGRAM(S): at 17:58

## 2021-01-09 RX ADMIN — Medication 600 MILLIGRAM(S): at 17:57

## 2021-01-09 RX ADMIN — Medication 1000 MILLIUNIT(S)/MIN: at 00:48

## 2021-01-09 RX ADMIN — Medication 600 MILLIGRAM(S): at 23:30

## 2021-01-09 RX ADMIN — Medication 600 MILLIGRAM(S): at 05:47

## 2021-01-09 RX ADMIN — Medication 975 MILLIGRAM(S): at 20:53

## 2021-01-09 RX ADMIN — Medication 1 TABLET(S): at 17:57

## 2021-01-09 NOTE — DISCHARGE NOTE OB - CARE PROVIDER_API CALL
Breonna Wolfe)  Obstetrics and Gynecology  [address] [place] [address]  Phone: [phone]  Fax: [phone]  Follow Up Time:

## 2021-01-09 NOTE — DISCHARGE NOTE OB - PATIENT PORTAL LINK FT
You can access the FollowMyHealth Patient Portal offered by [facility] by registering at the following website: [email or web address]. By joining [employer]’s FollowMyHealth portal, you will also be able to view your health information using other applications (apps) compatible with our system.

## 2021-01-09 NOTE — DISCHARGE NOTE OB - HOSPITAL COURSE
Patient underwent a normal spontaneous vaginal delivery. Post-partum course was uncomplicated. Pain is well controlled with PRN medication. She has no difficulty with ambulation, voiding, or PO intake. Lab values and vital signs are within normal limits prior to discharge.

## 2021-01-09 NOTE — DISCHARGE NOTE OB - CARE PROVIDERS DIRECT ADDRESSES
[email or web address]

## 2021-01-09 NOTE — DISCHARGE NOTE OB - PLAN OF CARE
1) Please take ibuprofen and/or Tylenol as needed for pain as prescribed.  2) Nothing in the vagina for 6 weeks (including no sex, no tampons, and no douching).  3) Please call your doctor for a follow up your postpartum appointment in 6 weeks.  4) Please continue taking vitamins postpartum. Take iron and colace for acute blood loss anemia.  5) Please call the office sooner if you have heavy vaginal bleeding, severe abdominal pain, or fever > 100.4F.  6) You may resume regular daily activity as tolerated Rapid recovery

## 2021-01-09 NOTE — OB PROVIDER DELIVERY SUMMARY - NSPROVIDERDELIVERYNOTE_OBGYN_ALL_OB_FT
Patient felt rectal pressure and pushed effectively once prior to delivery of viable female infant over intact perineum. Cord clamped and cut. Gases obtained. Placenta delivered intact and spontaneously. APGAR 9/9. Weight pending. EBL 101cc

## 2021-01-09 NOTE — DISCHARGE NOTE OB - CARE PLAN
Principal Discharge DX:	Normal delivery at term  Goal:	Rapid recovery  Assessment and plan of treatment:	1) Please take ibuprofen and/or Tylenol as needed for pain as prescribed.  2) Nothing in the vagina for 6 weeks (including no sex, no tampons, and no douching).  3) Please call your doctor for a follow up your postpartum appointment in 6 weeks.  4) Please continue taking vitamins postpartum. Take iron and colace for acute blood loss anemia.  5) Please call the office sooner if you have heavy vaginal bleeding, severe abdominal pain, or fever > 100.4F.  6) You may resume regular daily activity as tolerated

## 2021-01-10 ENCOUNTER — RESULT REVIEW (OUTPATIENT)
Age: 36
End: 2021-01-10

## 2021-01-10 VITALS
OXYGEN SATURATION: 96 % | TEMPERATURE: 98 F | SYSTOLIC BLOOD PRESSURE: 101 MMHG | RESPIRATION RATE: 15 BRPM | DIASTOLIC BLOOD PRESSURE: 67 MMHG | HEART RATE: 57 BPM

## 2021-01-10 PROCEDURE — 81003 URINALYSIS AUTO W/O SCOPE: CPT

## 2021-01-10 PROCEDURE — 87389 HIV-1 AG W/HIV-1&-2 AB AG IA: CPT

## 2021-01-10 PROCEDURE — 85014 HEMATOCRIT: CPT

## 2021-01-10 PROCEDURE — 86703 HIV-1/HIV-2 1 RESULT ANTBDY: CPT

## 2021-01-10 PROCEDURE — 86900 BLOOD TYPING SEROLOGIC ABO: CPT

## 2021-01-10 PROCEDURE — 86769 SARS-COV-2 COVID-19 ANTIBODY: CPT

## 2021-01-10 PROCEDURE — 86780 TREPONEMA PALLIDUM: CPT

## 2021-01-10 PROCEDURE — 86850 RBC ANTIBODY SCREEN: CPT

## 2021-01-10 PROCEDURE — 86901 BLOOD TYPING SEROLOGIC RH(D): CPT

## 2021-01-10 PROCEDURE — 88307 TISSUE EXAM BY PATHOLOGIST: CPT

## 2021-01-10 PROCEDURE — 88307 TISSUE EXAM BY PATHOLOGIST: CPT | Mod: 26

## 2021-01-10 PROCEDURE — G0463: CPT

## 2021-01-10 PROCEDURE — 36415 COLL VENOUS BLD VENIPUNCTURE: CPT

## 2021-01-10 PROCEDURE — 85018 HEMOGLOBIN: CPT

## 2021-01-10 PROCEDURE — 85025 COMPLETE CBC W/AUTO DIFF WBC: CPT

## 2021-01-10 PROCEDURE — 59050 FETAL MONITOR W/REPORT: CPT

## 2021-01-10 PROCEDURE — 59025 FETAL NON-STRESS TEST: CPT

## 2021-01-10 RX ADMIN — Medication 600 MILLIGRAM(S): at 11:51

## 2021-01-10 RX ADMIN — Medication 975 MILLIGRAM(S): at 10:11

## 2021-01-10 RX ADMIN — Medication 1 TABLET(S): at 11:51

## 2021-01-10 NOTE — PROGRESS NOTE ADULT - SUBJECTIVE AND OBJECTIVE BOX
S: Patient doing well. Minimal lochia. No complaints.    O: Vital Signs Last 24 Hrs  T(C): 36.5 (10 Tj 2021 04:45), Max: 36.7 (09 Jan 2021 17:30)  T(F): 97.7 (10 Tj 2021 04:45), Max: 98.1 (09 Jan 2021 17:30)  HR: 57 (10 Tj 2021 04:45) (57 - 68)  BP: 101/67 (10 Tj 2021 04:45) (101/67 - 112/71)  BP(mean): --  RR: 15 (10 Tj 2021 04:45) (15 - 18)  SpO2: 96% (10 Tj 2021 04:45) (95% - 96%)    Gen: NAD  Abd: soft, NT, ND, fundus firm below umbilicus  Ext: no tenderness    Labs:                        10.3   x     )-----------( x        ( 09 Jan 2021 19:01 )             30.6          A/P PP # 1  Doing well.  Routine post partum care.  Discharge home today as per patient wants.      
Name: ANALY SHIELDS  MRN: [id number]  Date Admitted: 21  Location: [facility] [id number] ([facility] 2EST)  Attending: Román Billingsley Salil      Post Partum: Vaginal Delivery Progress Note    ANALY SHIELDS is a 35y  s/p  PPD#1 of a viable female infant at bedside.     SUBJECTIVE:  No acute events overnight. Pain is well controlled with PRN pain medication. No problems with ambulating, voiding, or PO intake. Has had flatus and BM. Denies N/V. Patient is having normal lochia which is decreasing.    She is breastfeeding and the baby is latching on.     OBJECTIVE:    Vital Signs Last 24 Hrs  T(C): 36.5 (10 Tj 2021 04:45), Max: 36.7 (2021 17:30)  T(F): 97.7 (10 Tj 2021 04:45), Max: 98.1 (2021 17:30)  HR: 57 (10 Tj 2021 04:45) (57 - 68)  BP: 101/67 (10 Tj 2021 04:45) (101/67 - 112/71)  RR: 15 (10 Tj 2021 04:45) (15 - 18)  SpO2: 96% (10 Tj 2021 04:45) (95% - 96%)      Physical exam:  General: AOx3, NAD.  Heart: RRR. S1S2.  Lungs: CTABL. Good airflow bilaterally.   Abdomen: +BS, Soft, appropriately tender to palpitation, firm uterine fundus at umbilicus.  Pelvic: Lochia normal  Ext: No DVT signs, warm extremities.        LABS:                        10.3   x     )-----------( x        ( 2021 19:01 )             30.6

## 2021-01-10 NOTE — PROGRESS NOTE ADULT - ASSESSMENT
A/P:  ANALY SHIELDS is a 35y  s/p  PPD#1 of a viable female infant at bedside.   - VSS. Labs reviewed and wnl  - Pain is well controlled  - Tolerating PO intake  - Normal bowel function  - Bleeding wnl  - Rh +  - Dispo: Home pending attending approval

## 2021-01-11 ENCOUNTER — APPOINTMENT (OUTPATIENT)
Dept: ANTEPARTUM | Facility: CLINIC | Age: 36
End: 2021-01-11

## 2021-01-14 ENCOUNTER — APPOINTMENT (OUTPATIENT)
Dept: ANTEPARTUM | Facility: CLINIC | Age: 36
End: 2021-01-14

## 2021-01-19 LAB — SURGICAL PATHOLOGY STUDY: SIGNIFICANT CHANGE UP

## 2021-12-06 NOTE — DISCHARGE NOTE OB - HOSPITAL COURSE
33y/o  now s/p normal spontaneous vaginal delivery at 40.4GA for PROM and GDMA2. Patient transferred to post partum unit, uncomplicated hospital course. At the time of discharge patient was tolerating regular diet PO, ambulating, voiding, and having bowel movements and flatus. Pain well controlled with pain medications PRN. Pt to follow up with her OB-GYN for routine postpartum care in 6 weeks.
0

## 2022-07-19 NOTE — OB RN DELIVERY SUMMARY - NS_DELBLDTRANSFUS_OBGYN_ALL_OB
received voicemail from [facility] advised they faxed over form for additional questions for Jose  If did not receive form can call [employer] at [phone] and answer questions over the phone  No

## 2023-03-07 NOTE — OB PROVIDER H&P - NS_ZIKATRAVEL_OBGYN_ALL_OB
As discussed please follow up with your primary doctor or specialists provided in the  next couple of days for a recheck.  Please go to the ER if you have increased pain, fevers, chills, difficulty breathing, chest pains or any concerns at all.  
No

## 2023-08-01 NOTE — OB RN PATIENT PROFILE - NSTRANFUSIONOBJECTION_GEN_ALL_CORE_SIUH
Patient has no objection to blood transfusions. High Dose Vitamin A Pregnancy And Lactation Text: High dose vitamin A therapy is contraindicated during pregnancy and breast feeding.

## 2025-03-18 NOTE — OB PROVIDER H&P - NS_FETALMONCTXRES_OBGYN_ALL_OB
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 
101
Relaxed